# Patient Record
Sex: FEMALE | Race: WHITE | NOT HISPANIC OR LATINO | Employment: FULL TIME | URBAN - METROPOLITAN AREA
[De-identification: names, ages, dates, MRNs, and addresses within clinical notes are randomized per-mention and may not be internally consistent; named-entity substitution may affect disease eponyms.]

---

## 2022-05-17 ENCOUNTER — ANNUAL EXAM (OUTPATIENT)
Dept: OBGYN CLINIC | Facility: CLINIC | Age: 52
End: 2022-05-17
Payer: COMMERCIAL

## 2022-05-17 VITALS
WEIGHT: 124 LBS | DIASTOLIC BLOOD PRESSURE: 72 MMHG | BODY MASS INDEX: 21.17 KG/M2 | HEIGHT: 64 IN | SYSTOLIC BLOOD PRESSURE: 122 MMHG

## 2022-05-17 DIAGNOSIS — Z01.419 ENCOUNTER FOR GYNECOLOGICAL EXAMINATION WITHOUT ABNORMAL FINDING: Primary | ICD-10-CM

## 2022-05-17 DIAGNOSIS — Z12.31 ENCOUNTER FOR SCREENING MAMMOGRAM FOR MALIGNANT NEOPLASM OF BREAST: ICD-10-CM

## 2022-05-17 DIAGNOSIS — N95.1 PERIMENOPAUSAL: ICD-10-CM

## 2022-05-17 PROCEDURE — 99396 PREV VISIT EST AGE 40-64: CPT | Performed by: OBSTETRICS & GYNECOLOGY

## 2022-05-17 RX ORDER — OLOPATADINE HYDROCHLORIDE 1 MG/ML
SOLUTION/ DROPS OPHTHALMIC
COMMUNITY
Start: 2022-05-13

## 2022-05-17 RX ORDER — CETIRIZINE HYDROCHLORIDE 10 MG/1
TABLET ORAL
COMMUNITY

## 2022-05-17 NOTE — PROGRESS NOTES
[address], [address], [facility], [address] [facility]    ASSESSMENT/PLAN: Mayela Perkins is a 46 y o   who presents for annual gynecologic exam     Encounter for routine gynecologic examination  - Routine well woman exam completed today  - Cervical Cancer Screening: Current ASCCP Guidelines reviewed  Last Pap: 2020   Next Pap Due:   - COVID vaccine    + COVID   In   3/2022   [place]   2021   - Contraceptive counseling discussed  Current contraception: vasectomy :   - Breast Cancer Screening: Last Mammogram 2021, order given    - Colorectal cancer screening  Due in  } ordered  - The following were reviewed in today's visit: breast self exam, mammography screening ordered, menopause, osteoporosis, adequate intake of calcium and vitamin D, exercise, healthy diet and colonoscopy discussed and ordered    Additional problems addressed during this visit:  1  Encounter for gynecological examination without abnormal finding    2  Encounter for screening mammogram for malignant neoplasm of breast  -     Mammo screening bilateral w 3d & cad; Future    3  Perimenopausal  Comments:  lmp  2021  + hot flashe , wt gain and   memory  Notify office if desires SSRI or  HRT    4  BMI 21 0-21 9, adult        CC:  Annual Gynecologic Examination    HPI: Mayela Perkins is a 46 y o   who presents for annual gynecologic examination  47 yo here for wellness exam    LMP  Is   2021  The following portions of the patient's history were reviewed and updated as appropriate: She  has no past medical history on file  She  has a past surgical history that includes Hysteroscopy w/ endometrial ablation; Hysteroscopy w/ polypectomy;  section; Tonsillectomy; Cystectomy; Thyroplasty; and Bunionectomy    Her family history includes Breast cancer in her mother; Diabetes in her maternal grandmother; Hypertension in her father, maternal grandfather, maternal grandmother, and mother; Osteoporosis in her maternal grandmother and mother  She  reports that she has never smoked  She has never used smokeless tobacco  She reports current alcohol use  She reports that she does not use drugs  Current Outpatient Medications   Medication Sig Dispense Refill    cetirizine (ZyrTEC) 10 mg tablet Take by mouth      Cholecalciferol 50 MCG (2000 UT) TABS Take 2,000 Units by mouth in the morning   olopatadine (PATANOL) 0 1 % ophthalmic solution PUT 1 DROP INTO EACH EYE TWICE A DAY       No current facility-administered medications for this visit  She is allergic to cephalexin, gadobenate, nitrofurantoin, and sulfamethoxazole-trimethoprim       Review of Systems:  All systems normal except as noted in HPI          Objective:  /72 (BP Location: Left arm, Patient Position: Sitting, Cuff Size: Standard)   Ht 5' 4" (1 626 m)   Wt 56 2 kg (124 lb)   BMI 21 28 kg/m²    Physical Exam  Vitals and nursing note reviewed  Constitutional:       Appearance: Normal appearance  HENT:      Head: Normocephalic  Cardiovascular:      Rate and Rhythm: Normal rate and regular rhythm  Pulmonary:      Effort: Pulmonary effort is normal       Breath sounds: Normal breath sounds  Chest:      Chest wall: No mass, lacerations, swelling, tenderness or edema  Breasts: Jameson Score is 4  Breasts are symmetrical       Right: Normal  No swelling, bleeding, inverted nipple, mass, nipple discharge, skin change, tenderness, axillary adenopathy or supraclavicular adenopathy  Left: No swelling, bleeding, inverted nipple, mass, nipple discharge, skin change, tenderness, axillary adenopathy or supraclavicular adenopathy  Abdominal:      General: Abdomen is flat  Bowel sounds are normal       Palpations: Abdomen is soft  Genitourinary:     General: Normal vulva  Exam position: Lithotomy position  Pubic Area: No rash         Jameson stage (genital): 4       Labia:         Right: No rash, tenderness or lesion  Left: No rash, tenderness or lesion  Urethra: No urethral pain, urethral swelling or urethral lesion  Vagina: Normal       Cervix: No cervical motion tenderness or discharge  Uterus: Normal        Adnexa: Right adnexa normal and left adnexa normal       Rectum: Normal    Musculoskeletal:         General: Normal range of motion  Cervical back: Neck supple  Lymphadenopathy:      Upper Body:      Right upper body: No supraclavicular, axillary or pectoral adenopathy  Left upper body: No supraclavicular, axillary or pectoral adenopathy  Lower Body: No right inguinal adenopathy  No left inguinal adenopathy  Skin:     General: Skin is warm and dry  Neurological:      Mental Status: She is alert     Psychiatric:         Mood and Affect: Mood normal

## 2022-05-17 NOTE — PATIENT INSTRUCTIONS
Pap every 3 years starting at age 24 if normal, STI testing as indicated, exercise most days of week, obtain appropriate diet and hydration, Calcium 1000mg + 600 vit D daily, birth control as directed (ACHES reviewed)  Benefits, risks and alternatives discussed/reviewed  Condom use when sexually active for sexually transmitted infection prevention  HPV 9 vaccine recommended through age 39  Check with your insurance for coverage  If covered, call office to schedule start of vaccine series  Annual mammogram starting at age 36, monthly breast self exam  Ebbie Stank   at red light or at least  20 times a day

## 2022-07-18 ENCOUNTER — HOSPITAL ENCOUNTER (OUTPATIENT)
Dept: MAMMOGRAPHY | Facility: IMAGING CENTER | Age: 52
Discharge: HOME | End: 2022-07-18
Payer: COMMERCIAL

## 2022-07-18 VITALS — BODY MASS INDEX: 21.17 KG/M2 | HEIGHT: 64 IN | WEIGHT: 124 LBS

## 2022-07-18 DIAGNOSIS — Z12.31 ENCOUNTER FOR SCREENING MAMMOGRAM FOR MALIGNANT NEOPLASM OF BREAST: ICD-10-CM

## 2022-07-18 PROCEDURE — 77063 BREAST TOMOSYNTHESIS BI: CPT

## 2022-07-18 PROCEDURE — 77067 SCR MAMMO BI INCL CAD: CPT

## 2022-07-21 ENCOUNTER — TELEPHONE (OUTPATIENT)
Dept: OBGYN CLINIC | Facility: CLINIC | Age: 52
End: 2022-07-21

## 2022-08-02 ENCOUNTER — HOSPITAL ENCOUNTER (OUTPATIENT)
Dept: ULTRASOUND IMAGING | Facility: CLINIC | Age: 52
Discharge: HOME | End: 2022-08-02
Payer: COMMERCIAL

## 2022-08-02 VITALS — WEIGHT: 124 LBS | BODY MASS INDEX: 21.17 KG/M2 | HEIGHT: 64 IN

## 2022-08-02 DIAGNOSIS — R92.8 ABNORMAL SCREENING MAMMOGRAM: ICD-10-CM

## 2022-08-02 PROCEDURE — 76642 ULTRASOUND BREAST LIMITED: CPT

## 2022-08-04 ENCOUNTER — TELEPHONE (OUTPATIENT)
Dept: OBGYN CLINIC | Facility: CLINIC | Age: 52
End: 2022-08-04

## 2022-08-04 NOTE — TELEPHONE ENCOUNTER
Pt called requesting CPT code for screening mammogram and diagnosis code    CPT code: 76299  ICD 10: Z12 31

## 2022-08-17 ENCOUNTER — TELEPHONE (OUTPATIENT)
Dept: OBGYN CLINIC | Facility: CLINIC | Age: 52
End: 2022-08-17

## 2022-08-17 DIAGNOSIS — Z80.3 FAMILY HISTORY OF BREAST CANCER IN MOTHER: Primary | ICD-10-CM

## 2022-08-17 NOTE — TELEPHONE ENCOUNTER
Pt left a message, TAMIKO Lepe has recommended for her to have breast genetic testing and is calling to inquire what codes will be used to verify insurance coverage  Eleni, please review and advise, pt will need referral for ambulatory [facility] oncology genetics, my understanding is they will check with insurance for coverage

## 2022-08-18 NOTE — TELEPHONE ENCOUNTER
Left a message for patient to call back to discuss Eleni NP placed an order for patient to have genetics testing, provide scheduling information and inform they can help with providing insurance verification

## 2022-08-19 ENCOUNTER — TELEPHONE (OUTPATIENT)
Dept: GENETICS | Facility: CLINIC | Age: 52
End: 2022-08-19

## 2022-08-19 NOTE — TELEPHONE ENCOUNTER
Called patient and advised her of the referral and provided her with [facility] genetic [place] line number to call and schedule [phone]  She voiced appreciation

## 2022-08-19 NOTE — TELEPHONE ENCOUNTER
I called [facility] to schedule a new patient appointment with the Cancer Risk and Genetics Program       Outcome:  Genetics appointment scheduled for 10/7/2022 9am with [facility] MARIANO    Personal/Family History Related to Appointment:  Family history of Breast cancer    History of Genetic Testing:  Patient reports no personal or family history of genetic testing

## 2022-09-22 ENCOUNTER — TELEPHONE (OUTPATIENT)
Dept: HEMATOLOGY ONCOLOGY | Facility: CLINIC | Age: 52
End: 2022-09-22

## 2022-09-30 ENCOUNTER — TELEPHONE (OUTPATIENT)
Dept: GENETICS | Facility: CLINIC | Age: 52
End: 2022-09-30

## 2022-09-30 NOTE — TELEPHONE ENCOUNTER
I called and spoke with Mike Henderson ahead of her genetic counseling appointment on 10/07/2022  I explained that we have a family history questionnaire available that Mike Henderson can fill out prior to her visit in order to expedite the family history intake  In order to complete this form, I can send Mike Henderson a link via email that will direct her to the questionaire  Outcome:  Patient is agreeable to completing this questionaire  I confirmed the patient's email address and will send a link

## 2022-10-07 ENCOUNTER — CLINICAL SUPPORT (OUTPATIENT)
Dept: GENETICS | Facility: CLINIC | Age: 52
End: 2022-10-07

## 2022-10-07 ENCOUNTER — DOCUMENTATION (OUTPATIENT)
Dept: GENETICS | Facility: CLINIC | Age: 52
End: 2022-10-07

## 2022-10-07 DIAGNOSIS — Z80.41 FAMILY HISTORY OF OVARIAN CANCER: ICD-10-CM

## 2022-10-07 DIAGNOSIS — D44.7 PARAGANGLIOMA (HCC): ICD-10-CM

## 2022-10-07 DIAGNOSIS — Z80.3 FAMILY HISTORY OF BREAST CANCER: Primary | ICD-10-CM

## 2022-10-07 PROCEDURE — NC001 PR NO CHARGE: Performed by: GENETIC COUNSELOR, MS

## 2022-10-07 NOTE — LETTER
2022     Kenny Vegas, [address] [facility] [address] [place] [address],[address]  [place] [address]    Patient: Espinoza Goodson  YOB: 1970  Date of Visit: 10/7/2022      Dear Dr Daisy Collins: Thank you for referring Espinoza Goodson to me for evaluation  Below are my notes for this consultation  If you have questions, please do not hesitate to call me  I look forward to following your patient along with you  Sincerely,        Nicholas Mix        CC: Caren Bender MD        Pre-Test Genetic Counseling Consult Note    Patient Name: Espinoza Goodson   /Age: 10 y o  Referring Provider: JUSTINO Olivia    Date of Service: 10/7/2022  Genetic Counselor: Nicholas Mxi MS, [address] [facility]  Interpretation Services: None  [facility]      Keeley Blanchard was referred to the [address] [place] [address] and Genetic Assessment Program due to her family history of BREAST AND OVARIAN CANCER  she presents today to discuss the possibility of a hereditary cancer syndrome, options for genetic testing, and implications for her and her family  Cancer History and Treatment:   Personal History:  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     Screening Hx:   Breast:  Breast Imaging: Mammo on  and follow up left u/s  IMPRESSION:   Minimally complicated cyst corresponds to the mammographic mass  Recommend ultrasound re-evaluation in 6 months      Colon:  Colonoscopy: has had a few over lifetime  3-5 polyps reported over lifetime, most recent colonoscopy was clear    Gynecologic:  Ovaries and Uterus intact    History of uterine polyps    Skin:  saw derm last year, no concerns    Other screening: none    Reproductive History  Age at menarche: 4  Age at first live birth: 25  Menopause: Perimenopausal  Hormone replacement: none    Medical and Surgical History  Pertinent surgical history:   Past Surgical History:   Procedure Laterality Date    BUNIONECTOMY      left foot bunionectomy and bunionette      SECTION      CYSTECTOMY      paraganglioma cyst removal    HYSTEROSCOPY W/ ENDOMETRIAL ABLATION      2016    HYSTEROSCOPY W/ POLYPECTOMY      THYROPLASTY      goretex    TONSILLECTOMY        Pertinent medical history:No past medical history on file  Other History:  Height:   Ht Readings from Last 1 Encounters:   22 5' 4" (1 626 m)     Weight:   Wt Readings from Last 1 Encounters:   22 56 2 kg (124 lb)       Relevant Family History   Patient reports no Ashkenazi Uatsdin ancestry  Sister with recent breast follow up due to bloody discharge  Biopsy performed was benign    Maternal Family History: Mother (80) history of right breast cancer (dx 78)  Aunt (68) history of ovarian cancer  No other reported history of cancer    Paternal Family History:   Limited information on paternal family history    Please refer to the scanned pedigree in the Media Tab for a complete family history     *All history is reported as provided by the patient; records are not available for review, except where indicated  Assessment:  We discussed sporadic, familial and hereditary cancer  We also discussed the many factors that influence our risk for cancer such as age, environmental exposures, lifestyle choices and family history  We reviewed the indications suggestive of a hereditary predisposition to cancer      Genetic testing is indicated for Rashad Lizama based on the following criteria: Meets NCCN V1 2023 Testing Criteria for High-Penetrance Breast Cancer Susceptibility Genes: family history of a first-degree relative with a history of breast cancer and one close blood relative diagnosed with ovarian cancer  Meets NCCN R3 6983 Testing Criteria for Genetic Risk Evaluation for Hereditary Endocrine Neoplasia Syndromes: personal history of paraganglioma    The risks, benefits, and limitations of genetic testing were reviewed with the patient, as well as genetic discrimination laws, and possible test results (positive, negative, variants of uncertain significance) and their clinical implications  If positive for a mutation, options for managing cancer risk including increased surveillance, chemoprevention, and in some cases prophylactic surgery were discussed  Sacha Hoffman was informed that if a hereditary cancer syndrome was identified in her, first degree relatives (parents, siblings, and children) have a chance of also inheriting the condition  Genetic testing would allow for predictive genetic testing in other relatives, who may also be at risk depending on their degree of relation  Plan: Patient decided to proceed with testing and provided consent  Summary:     Sample Collection:  A blood kit was mailed home to the patient    Genetic Testing Preformed: Mikael Zavala (39 total genes): APC, CAROLINE, AXIN2 BARD1, BRCA1, BRCA2, BRIP1, BMPR1A, CDH1, CDK4, CDKN2A, CHEK2, DICER1, EPCAM, GREM1, HOXB13, MLH1, MSH2, MSH3, MSH6, MUTYH, NBN, NF1, NTHL1, PALB2, PMS2, POLD1, POLE, PTEN, RAD51C, RAD51D, RECQL SMAD4, SMARCA4, STK11, TP53 + Ambry PGLNext (14 genes): EGLN1, FH, KIF1B, MAX, MEN1, NF1, RET, SDHA, SDHAF2, SDHB, SDHC, SDHD, [id number], VHL     Results take approximately 2-3 weeks to complete once test is started  We will contact Sacha Yasmin once results are available  Additional recommendations for surveillance/medical management will be made pending genetic test results

## 2022-10-07 NOTE — PROGRESS NOTES
Pre-Test Genetic Counseling Consult Note    Patient Name: Shona Burroughs DOB/Age:  y o  Referring Provider: JUSTINO Casper    Date of Service: 10/7/2022  Genetic Counselor: Emily Queen MS, [facility]  Interpretation Services: None  [facility]      Areli Mehta was referred to the [address] [place] [address] and Genetic Assessment Program due to her family history of BREAST AND OVARIAN CANCER  she presents today to discuss the possibility of a hereditary cancer syndrome, options for genetic testing, and implications for her and her family  Cancer History and Treatment:   Personal History:  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     Screening Hx:   Breast:  Breast Imaging: Mammo on  and follow up left u/s  IMPRESSION:   Minimally complicated cyst corresponds to the mammographic mass  Recommend ultrasound re-evaluation in 6 months  Colon:  Colonoscopy: has had a few over lifetime  3-5 polyps reported over lifetime, most recent colonoscopy was clear    Gynecologic:  Ovaries and Uterus intact    History of uterine polyps    Skin:  saw derm last year, no concerns    Other screening: none    Reproductive History  Age at menarche: 4  Age at first live birth: 25  Menopause: Perimenopausal  Hormone replacement: none    Medical and Surgical History  Pertinent surgical history:   Past Surgical History:   Procedure Laterality Date    BUNIONECTOMY      left foot bunionectomy and bunionette      SECTION      CYSTECTOMY      paraganglioma cyst removal    HYSTEROSCOPY W/ ENDOMETRIAL ABLATION      2016    HYSTEROSCOPY W/ POLYPECTOMY      THYROPLASTY      goretex    TONSILLECTOMY        Pertinent medical history:No past medical history on file        Other History:  Height:   Ht Readings from Last 1 Encounters:   22 5' 4" (1 626 m)     Weight:   Wt Readings from Last 1 Encounters:   22 56 2 kg (124 lb) Relevant Family History   Patient reports no Ashkenazi Scientologist ancestry  Sister with recent breast follow up due to bloody discharge  Biopsy performed was benign    Maternal Family History: Mother (80) history of right breast cancer (dx 78)  Aunt (68) history of ovarian cancer  No other reported history of cancer    Paternal Family History:   Limited information on paternal family history    Please refer to the scanned pedigree in the Media Tab for a complete family history     *All history is reported as provided by the patient; records are not available for review, except where indicated  Assessment:  We discussed sporadic, familial and hereditary cancer  We also discussed the many factors that influence our risk for cancer such as age, environmental exposures, lifestyle choices and family history  We reviewed the indications suggestive of a hereditary predisposition to cancer  Genetic testing is indicated for Katheryn Fairchild based on the following criteria: Meets NCCN V1 2023 Testing Criteria for High-Penetrance Breast Cancer Susceptibility Genes: family history of a first-degree relative with a history of breast cancer and one close blood relative diagnosed with ovarian cancer  Meets NCCN A3 0139 Testing Criteria for Genetic Risk Evaluation for Hereditary Endocrine Neoplasia Syndromes: personal history of paraganglioma    The risks, benefits, and limitations of genetic testing were reviewed with the patient, as well as genetic discrimination laws, and possible test results (positive, negative, variants of uncertain significance) and their clinical implications  If positive for a mutation, options for managing cancer risk including increased surveillance, chemoprevention, and in some cases prophylactic surgery were discussed   Katheryn Fairchild was informed that if a hereditary cancer syndrome was identified in her, first degree relatives (parents, siblings, and children) have a chance of also inheriting the condition  Genetic testing would allow for predictive genetic testing in other relatives, who may also be at risk depending on their degree of relation  Plan: Patient decided to proceed with testing and provided consent  Summary:     Sample Collection:  A blood kit was mailed home to the patient    Genetic Testing Preformed: Terry Landeros (39 total genes): APC, CAROLINE, AXIN2 BARD1, BRCA1, BRCA2, BRIP1, BMPR1A, CDH1, CDK4, CDKN2A, CHEK2, DICER1, EPCAM, GREM1, HOXB13, MLH1, MSH2, MSH3, MSH6, MUTYH, NBN, NF1, NTHL1, PALB2, PMS2, POLD1, POLE, PTEN, RAD51C, RAD51D, RECQL SMAD4, SMARCA4, STK11, TP53 + Ambry PGLNext (14 genes): EGLN1, FH, KIF1B, MAX, MEN1, NF1, RET, SDHA, SDHAF2, SDHB, SDHC, SDHD, [id number], VHL     Results take approximately 2-3 weeks to complete once test is started  We will contact Zenia Cruz once results are available  Additional recommendations for surveillance/medical management will be made pending genetic test results

## 2022-10-14 ENCOUNTER — APPOINTMENT (OUTPATIENT)
Dept: LAB | Facility: HOSPITAL | Age: 52
End: 2022-10-14
Payer: COMMERCIAL

## 2022-10-14 DIAGNOSIS — Z80.41 FAMILY HISTORY OF OVARIAN CANCER: ICD-10-CM

## 2022-10-14 DIAGNOSIS — Z80.3 FAMILY HISTORY OF BREAST CANCER IN SISTER: ICD-10-CM

## 2022-10-14 PROCEDURE — 36415 COLL VENOUS BLD VENIPUNCTURE: CPT

## 2022-11-02 ENCOUNTER — TELEPHONE (OUTPATIENT)
Dept: GENETICS | Facility: CLINIC | Age: 52
End: 2022-11-02

## 2022-11-02 DIAGNOSIS — D44.7 PARAGANGLIOMA (HCC): ICD-10-CM

## 2022-11-02 DIAGNOSIS — Z15.89 MONOALLELIC MUTATION OF SDHB GENE: Primary | ICD-10-CM

## 2022-11-02 NOTE — TELEPHONE ENCOUNTER
Post-Test Genetic Counseling Consult Note  Today I spoke with Alessio Arora over the phone to review the results of her genetic test for hereditary cancer  We met previously on 10/7 for pre-test counseling  SUMMARY:    Test(s): [facility] (36 total genes): APC, CAROLINE, AXIN2 BARD1, BRCA1, BRCA2, BRIP1, BMPR1A, CDH1, CDK4, CDKN2A, CHEK2, DICER1, EPCAM, GREM1, HOXB13, MLH1, MSH2, MSH3, MSH6, MUTYH, NBN, NF1, NTHL1, PALB2, PMS2, POLD1, POLE, PTEN, RAD51C, RAD51D, RECQL SMAD4, SMARCA4, STK11, TP53 + [facility] PGLNext (14 genes): EGLN1, FH, KIF1B, MAX, MEN1, NF1, RET, SDHA, SDHAF2, SDHB, SDHC, SDHD, [id number], VHL    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), Heterozygous, Pathogenic      Additional Result: Variant of uncertain significance  RECQL c 245T>C (p I82T); heterozygous; uncertain significance     SDHB Assessment:   Alessio Arora 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, 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)  The SDHB gene is associated with autosomal dominant hereditary paraganglioma-pheochromocytoma (PGL-PCC) syndrome ([employer] UID: [id number]), gastrointestinal stromal tumors (GIST) ([employer] UID: [id number]), renal cancer (PMID: 68754247, 85711737) and autosomal recessive mitochondrial complex II deficiency with or without cardiomyopathy (Toy Mitchell: [id number])  Studies also suggest that the SDHB gene is associated with pituitary adenomas, thyroid cancer and other neuroendocrine tumors (PMID: 70674766)  Hereditary Paraganglioma-Pheochromocytoma (PGL-PCC) syndrome    Paraganglioma (PGL)  Individuals with pathogenic variants in the SDHB gene have an increased risk for paragangliomas (PGL)  PGLs are rare neuroendocrine tumors that usually form along major blood vessels and nerve pathways in your head, neck, thorax and abdomen  They can develop along the sympathetic nervous system and/or the parasympathetic nervous system  The SDHB gene is strongly associated with paragangliomas of the sympathetic nervous system          - Sympathetic paragangliomas most often develop in the lower mediastinum, abdomen and pelvis  They can produce extra hormones called catecholamines  The extra hormones can cause symptoms such as high blood pressure, headaches, heart palpitations, arrhythmias, excess sweating and anxiety  - Parasympathetic paragangliomas most often develop in the head, neck and upper mediastinum  Typically they do not produce extra hormones  Individuals with head and neck PGL may present with large neck masses, hoarse voice, pain, or cough (PMID: 85609791)  Depending on the location of the PGL individuals may also experience tinnitus (ringing in the ear that doesn't stop) or hearing loss (PMID: 88864485)  The majority of paragangliomas are benign (non-cancerous) however they have the potential to become cancerous and metastasize (travel to other parts of the body)  Individuals with an SDHB mutation have a greater risk of developing a malignant paraganglioma and metastatic disease  Pheochromocytoma [facility])   Individuals with pathogenic variants in the SDHB gene have an increased risk for pheochromocytomas [facility])  PCCs are rare tumors that develop in the adrenal gland  We have two adrenal glands (one located at the top of each kidney)  These tumors are usually benign (non-cancerous) but a small percentage can become cancerous and metastasize  These tumors can produce extra hormones called catecholamines that can cause symptoms such as high blood pressure, headaches, heart palpitations, arrhythmias, excess sweating and anxiety  The lifetime risk of developing a paraganglioma and/or pheochromocytoma is 22-49% (PMID: 09534009, 79785845, 50035244)  Gastrointestinal stromal tumors (GIST)  Individuals with pathogenic variants in the SDHB gene may have an increased risk for gastrointestinal stromal tumors (GIST) however the exact lifetime risk is not known    The GISTs are almost always located in the stomach and gastric (stomach) bleeding is often a symptom  Renal clear cell carcinoma (Kidney Cancer)  Individuals with pathogenic variants in the SDHB gene have a 5% lifetime risk of developing kidney cancer compared to 1 7% risk in the general population (PMID: 98662792, 03947294)  Other Tumors  Papillary thyroid cancer, pituitary adenomas and neuroendocrine tumors have also been described in individuals with pathogenic variants in the SDHB gene however the exact lifetime risk is not known  Reproductive Risks:  Reena Galindo 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 and is a 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  Autosomal recessive means an individual needs two pathogenic variants in the SDHB gene to be affected with this condition  This is a rare occurrence  If both Reena Galindo and her partner carry one SDHB pathogenic variant there is a risk of having a child with Mitochondrial complex II deficiency  Mitochondrial complex II deficiency is characterized by progressive encephalopathy, myopathy, hypotonia and leukodystrophy  Poor growth, short stature and joint contractures are common findings while only a subset of affected individuals also develop cardiomyopathy  The severity of features is highly variable, ranging from adult-onset of milder symptoms with normal cognition to severe, life-threatening infantile onset (PMID: 40558912, 95236543)  We discussed the reproductive risk for Mitochondrial complex II deficiency including reproductive risks for children if they were found to also carry this SDHB variant  Risks and Testing for Family Members: This test result may help clarify the risk for other family members  All first-degree relatives (parents, siblings and children) have up to a 50% chance of having the SDHB pathogenic variant  Other relatives such as aunts, uncles and cousins may also be at risk        Since it is not known with one-hundred percent certainty which side of the family this SDHB pathogenic variant came from, we recommend that Chris Kimble share this test results with extended family members from both sides of the family  Individuals with SDHB pathogenic variants should begin surveillance between the ages of 9-7 years old therefore testing minor children may be considered  We encouraged Chris Kimble to discuss this information with relatives  If Chris Kimble family members have any questions or are interested in testing they can reach out to the [employer] number at [phone] for additional information  [facility] offers free family member testing for any of Radha's blood relatives up to 80 days following a positive genetic test result  If any of Radha's family members have any questions regarding genetic testing would like to pursue genetic testing to understand if they carry the same SDHB mutation as Chris Kimble they can reach out to the [facility] number at [phone] for additional information  Assessment for VUS Result:  A variant of uncertain significance (VUS) means that a change was identified in a specific gene but it cannot be determined whether the variant is associated with an increased risk of cancer or is a harmless genetic change  The significance of the RECQL variant is currently not known and therefore this test result cannot be used to help determine Chris Kimble cancer risks  It is possible that the variant was seen in only a handful of individuals, or there may be conflicting or incomplete information in the medical literature about the variant and its association with hereditary cancer  Genetic testing for this RECQL variant is not recommended for relatives who wish to determine their cancer risks for purposes of determining medical management  The presence or absence of this variant in a relative is not clinically meaningful unless the variant is reclassified in the future       The laboratory will continue to accumulate information on this variant and will reclassify it as either a positive or negative genetic test result when they are confident that they have adequate information  As updated information is obtained, we will notify Rebekah Bush with the updated information  It is important to note that the majority of variants of uncertain significance are reclassified as likely benign or benign as additional information about the variant becomes available  Risk Based on Family History:  Radha's risk of breast cancer may still be increased based on her personal risk factors and family history  Given that the SDHB mutation and RECQL VUS do not explain her family history, we are able to run risk models to better estimate Radha's lifetime risk of developing breast cancer  I ran three risk models based on the information Rebekah Bush provided during our pre-test counseling session:    Debraer-Tatumck model: Estimated lifetime risk, to age 80, for breast cancer is 16 11%    Lamont Mckeon model:Estimated lifetime risk, to age 80, for breast cancer is 14 25%     Abner tables: Estimated lifetime risk, to age 78, for breast cancer is 8 8%    Although these risk models do not predict a significantly increased lifetime risk, we cannot eliminate the possibility of an increased risk for breast cancer for Rebekah Bush given her family history  I also reminded Rebekah Bush that there is still a background risk for any woman to develop breast cancer over her lifetime (12-13%), regardless of family history      SDHB Management:  Management recommendations for individuals with a pathogenic or likely pathogenic SDHB mutations have been pulled from the "Endocrine Society Clinical Practice Guidelines" (PMID: 03466641), the "[address] Pediatric Oncology Series for [place] [facility] Hippel[place] and Hereditary Pheochromocytoma/Paraganglioma Syndromes: Clinical Features, Genetics, and Surveillance Recommendations in Childhood" (PMID: 12279044), and the NCCN Kidney Cancer, Hereditary Renal Cell Carcinoma, Kidney Specific Screening Recommendations guidelines  These recommendations are subject to change over time and the newest guidelines should be referenced for the most up to date recommendations  We also recommend that Grace Julio meet with a healthcare provider familiar with Hereditary PGL-PCC syndrome  I discussed with Grace Julio that I will be placing a referral to Dr Rachel Olmstead so that she can meet with a provider familiar with this condition  Additional Information:  A healthy lifestyle will improve overall health and reduce risk for illness  Eating a healthy diet and exercising for 4 hours per week is recommended  Both diet and exercise have been shown to help maintain a healthy weight  Moderate to heavy alcohol use can increase the risk for some cancers  Smoking cigarettes can also increase risk for breast, lung, prostate, pancreatic and other cancers  Plan:    PGL/PCC Screening:  - Blood pressure monitoring at all medical visits starting at age 10-11  - Screening blood test for plasma-free metanephrines or 24-hour urine test for fractionated metanephrines every year starting at age 10-11   - Blood and/or urine screening should be done prior to any surgical procedures  - Cross-sectional imaging of the skull base to pelvis every 2-3 years starting at age 10-11  This can be done by whole-body MRI (if available) or other non-radiation-containing imaging procedures  If whole-body MRI is not available, may consider abdominal MRI, skull base and neck MRI, and chest CT    - Education about the signs and symptoms of PGL and [address] tumors  Symptoms of these tumors may include a lump that gets bigger over time, hearing loss, ringing in the ears, difficulty swallowing, hoarseness of the voice, problems moving the tongue, unexplained sweating, headaches, heart palpitations, paleness, and/or feelings of anxiety or panic      Treatment of PGL and [address] may include surgery  - Due to the risk of bilateral tumors, consideration should be given to cortical-sparing adrenalectomy  See NCCN Neuroendocrine and Adrenal Tumors guidelines for further details about surgical and treatment recommendations  GIST Screening:  - Consider complete blood count with RBC indices annually; consider beginning in childhood  - Some experts suggest evaluation for GIST should you experience unexplained gastrointestinal symptoms (e g  abdominal pain, nausea, vomiting, or difficulty swallowing) or unexplained obstruction or anemia    Renal Cancer Screening:    NCCN V3 2023 Kidney Cancer -> Hereditary Renal Cell Carcinoma -> Kidney Specific Screening Recommendations   - Abdominal MRI (preferred) every 4-6 years starting at age 15, or 10 years before the earliest kidney cancer in the family (whichever comes first)  -If an abdominal MRI is unavailable, consider a CT with and without IV contrast  MRI is preferred for surveillance to limit radiation exposure; however, CT can be used for surgical planning as needed  - If lesions are detected, increase imaging frequency based on growth rate and size  - Women of childbearing age should consider imaging prior to planned pregnancy  - If whole-body MRI is being done for PGL-PCC screening, additional imaging of the kidneys may not be necessary  Treatment of kidney cancer or tumors may include surgery:   - Malignant tumors absent aggressive histology and early stage should undergo surgical resection; partial nephrectomy can be considered  - Consider radical nephrectomy for larger tumors and those with aggressive histology (eg, high grade, sarcomatoid)  - See NCCN Kidney Cancer guidelines for further syndrome specific details about surgical and treatment recommendations  Other Screening:  - There are no additional recommendations based on Radha's result    she should continue cancer screening and medical management as clinically indicated and as determined appropriate by her healthcare providers  Pregnancy considerations:  - Pregnant individuals should be monitored more closely due to the risk of complications if a secreting tumor is present  - Ideally, individuals should be screened before a planned pregnancy  Other Screening: There are no additional recommendations based on Radha's result  she should continue cancer screening and medical management as clinically indicated and as determined appropriate by her healthcare providers  Summary:   Positive Result: Melvina Yoon was strongly encouraged to follow up on with our office on an annual basis to review the most up to date guidelines as recommendations are subject to change over time  VUS Result: Melvina Yoon was strongly encouraged to contact us regarding any changes in her personal or family history of cancer as these changes could alter our recommendation regarding genetic testing and/or cancer screening  Melvina Yoon was also encouraged to follow up with us on an annual basis as variant classifications are subject to change

## 2022-11-09 ENCOUNTER — CONSULT (OUTPATIENT)
Dept: HEMATOLOGY ONCOLOGY | Facility: HOSPITAL | Age: 52
End: 2022-11-09

## 2022-11-09 VITALS
HEART RATE: 67 BPM | OXYGEN SATURATION: 98 % | DIASTOLIC BLOOD PRESSURE: 80 MMHG | BODY MASS INDEX: 21 KG/M2 | HEIGHT: 64 IN | TEMPERATURE: 98.1 F | SYSTOLIC BLOOD PRESSURE: 120 MMHG | WEIGHT: 123 LBS | RESPIRATION RATE: 16 BRPM

## 2022-11-09 DIAGNOSIS — D44.7 PARAGANGLIOMA (HCC): ICD-10-CM

## 2022-11-09 DIAGNOSIS — D50.0 IRON DEFICIENCY ANEMIA DUE TO CHRONIC BLOOD LOSS: Primary | ICD-10-CM

## 2022-11-09 DIAGNOSIS — Z15.89 MONOALLELIC MUTATION OF SDHB GENE: ICD-10-CM

## 2022-11-09 RX ORDER — DIPHENOXYLATE HYDROCHLORIDE AND ATROPINE SULFATE 2.5; .025 MG/1; MG/1
1 TABLET ORAL DAILY
COMMUNITY

## 2022-11-10 ENCOUNTER — TELEPHONE (OUTPATIENT)
Dept: HEMATOLOGY ONCOLOGY | Facility: CLINIC | Age: 52
End: 2022-11-10

## 2022-11-10 ENCOUNTER — TELEPHONE (OUTPATIENT)
Dept: GENETICS | Facility: CLINIC | Age: 52
End: 2022-11-10

## 2022-11-10 NOTE — TELEPHONE ENCOUNTER
Patient called regarding availability for daughters to be tested  I let her know the possible appointment types and window for testing  Patient said she would call back to schedule the appointment(s)

## 2022-11-10 NOTE — TELEPHONE ENCOUNTER
Returned call to patient and left VM  I gave her the number for genetics and also left my direct number if she needs anything further

## 2022-11-10 NOTE — TELEPHONE ENCOUNTER
CALL RETURN FORM   Reason for patient call? Patient calling asking about genetic testing for her daughters    Patient's primary oncologist? Ariane Baez    Name of person the patient was calling for? Malik   Any additional information to add, if applicable? [phone]   Informed patient that the message will be forwarded to the team and someone will get back to them as soon as possible    Did you relay this information to the patient?   yes

## 2022-11-10 NOTE — TELEPHONE ENCOUNTER
Medical Records Request Route to [facility]   Person calling In patient   Provider [facility]   Fax Number / Corey Beck name (Attention:)   [address] [place] call back number [phone]   Patient call back number [phone]

## 2022-11-11 LAB
ALBUMIN SERPL-MCNC: 4.3 G/DL (ref 3.6–5.1)
ALBUMIN/GLOB SERPL: 1.6 (CALC) (ref 1–2.5)
ALP SERPL-CCNC: 71 U/L (ref 37–153)
ALT SERPL-CCNC: 12 U/L (ref 6–29)
AST SERPL-CCNC: 17 U/L (ref 10–35)
BASOPHILS # BLD AUTO: 21 CELLS/UL (ref 0–200)
BASOPHILS NFR BLD AUTO: 0.5 %
BILIRUB SERPL-MCNC: 0.7 MG/DL (ref 0.2–1.2)
BUN SERPL-MCNC: 19 MG/DL (ref 7–25)
BUN/CREAT SERPL: NORMAL (CALC) (ref 6–22)
CALCIUM SERPL-MCNC: 9.6 MG/DL (ref 8.6–10.4)
CHLORIDE SERPL-SCNC: 104 MMOL/L (ref 98–110)
CO2 SERPL-SCNC: 29 MMOL/L (ref 20–32)
CREAT SERPL-MCNC: 0.85 MG/DL (ref 0.5–1.03)
EOSINOPHIL # BLD AUTO: 130 CELLS/UL (ref 15–500)
EOSINOPHIL NFR BLD AUTO: 3.1 %
ERYTHROCYTE [DISTWIDTH] IN BLOOD BY AUTOMATED COUNT: 12.6 % (ref 11–15)
FERRITIN SERPL-MCNC: 28 NG/ML (ref 16–232)
GFR/BSA.PRED SERPLBLD CYS-BASED-ARV: 81 ML/MIN/1.73M2
GLOBULIN SER CALC-MCNC: 2.7 G/DL (CALC) (ref 1.9–3.7)
GLUCOSE SERPL-MCNC: 82 MG/DL (ref 65–99)
HCT VFR BLD AUTO: 41.8 % (ref 35–45)
HGB BLD-MCNC: 13.3 G/DL (ref 11.7–15.5)
IRON SERPL-MCNC: 99 MCG/DL (ref 45–160)
LYMPHOCYTES # BLD AUTO: 1852 CELLS/UL (ref 850–3900)
LYMPHOCYTES NFR BLD AUTO: 44.1 %
MCH RBC QN AUTO: 26.2 PG (ref 27–33)
MCHC RBC AUTO-ENTMCNC: 31.8 G/DL (ref 32–36)
MCV RBC AUTO: 82.4 FL (ref 80–100)
MONOCYTES # BLD AUTO: 357 CELLS/UL (ref 200–950)
MONOCYTES NFR BLD AUTO: 8.5 %
NEUTROPHILS # BLD AUTO: 1840 CELLS/UL (ref 1500–7800)
NEUTROPHILS NFR BLD AUTO: 43.8 %
PLATELET # BLD AUTO: 207 THOUSAND/UL (ref 140–400)
PMV BLD REES-ECKER: 10.4 FL (ref 7.5–12.5)
POTASSIUM SERPL-SCNC: 3.9 MMOL/L (ref 3.5–5.3)
PROT SERPL-MCNC: 7 G/DL (ref 6.1–8.1)
RBC # BLD AUTO: 5.07 MILLION/UL (ref 3.8–5.1)
SODIUM SERPL-SCNC: 140 MMOL/L (ref 135–146)
TRANSFERRIN SERPL-MCNC: 288 MG/DL (ref 188–341)
WBC # BLD AUTO: 4.2 THOUSAND/UL (ref 3.8–10.8)

## 2022-11-15 NOTE — PROGRESS NOTES
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  Genetic testing was sent because she had a paraganglioma resected from her neck at [facility] in   See pedigree in genetic counselor chart for famhx  She denies any abd pain  No weight loss  No HA  No bowel problems  No BRBPR or melena  Her last colonoscopy was in 2018  She had heavy periods in the past but they stopped since she had a D&C  She works as an  to a   She denies tob  No Etoh  ROS: As stated in history of present illness otherwise her 14 point review of systems today was negative  ECOG PS: 0    Test Results:    Imaging: No results found  Labs:   Lab Results   Component Value Date    WBC 4 2 11/10/2022    HGB 13 3 11/10/2022    HCT 41 8 11/10/2022    MCV 82 4 11/10/2022     11/10/2022     Lab Results   Component Value Date    K 3 9 11/10/2022     11/10/2022    CO2 29 11/10/2022    BUN 19 11/10/2022    CREATININE 0 85 11/10/2022    CALCIUM 9 6 11/10/2022    AST 17 11/10/2022    ALT 12 11/10/2022    ALKPHOS 71 11/10/2022    EGFR 81 11/10/2022         No results found for: SPEP, UPEP    No results found for: PSA    No results found for: CEA    No results found for: AFP    Lab Results   Component Value Date    IRON 99 11/10/2022    FERRITIN 28 11/10/2022       No results found for: [id number]            Active Problems:   Patient Active Problem List   Diagnosis   • BMI 21 0-21 9, adult   • Perimenopausal       Past Medical History: No past medical history on file      Surgical History:   Past Surgical History:   Procedure Laterality Date   • BUNIONECTOMY      left foot bunionectomy and bunionette    •  SECTION     • CYSTECTOMY paraganglioma cyst removal   • HYSTEROSCOPY W/ ENDOMETRIAL ABLATION      2016   • HYSTEROSCOPY W/ POLYPECTOMY     • THYROPLASTY      goretex   • TONSILLECTOMY         Family History:    Family History   Problem Relation Age of Onset   • Osteoporosis Mother    • Breast cancer Mother         no genetic screening   • Hypertension Mother    • Hypertension Father    • No Known Problems Sister    • No Known Problems Sister    • No Known Problems Daughter    • No Known Problems Daughter    • No Known Problems Daughter    • Osteoporosis Maternal Grandmother    • Hypertension Maternal Grandmother    • Diabetes Maternal Grandmother    • Hypertension Maternal Grandfather    • No Known Problems Paternal Grandmother    • No Known Problems Paternal Grandfather    • No Known Problems Maternal Aunt    • No Known Problems Maternal Aunt    • No Known Problems Maternal Aunt    • No Known Problems Paternal Aunt    • Ovarian cancer Neg Hx    • Colon cancer Neg Hx        Cancer-related family history includes Breast cancer in her mother  There is no history of Ovarian cancer or Colon cancer  Social History:   Social History     Socioeconomic History   • Marital status: /Civil Union     Spouse name: Not on file   • Number of children: Not on file   • Years of education: Not on file   • Highest education level: Not on file   Occupational History   • Not on file   Tobacco Use   • Smoking status: Never Smoker   • Smokeless tobacco: Never Used   Vaping Use   • Vaping Use: Never used   Substance and Sexual Activity   • Alcohol use:  Yes   • Drug use: Never   • Sexual activity: Yes     Partners: Male     Birth control/protection: Post-menopausal   Other Topics Concern   • Not on file   Social History Narrative   • Not on file     Social Determinants of Health     Financial Resource Strain: Not on file   Food Insecurity: Not on file   Transportation Needs: Not on file   Physical Activity: Not on file   Stress: Not on file   Social Connections: Not on file   Intimate Partner Violence: Not on file   Housing Stability: Not on file       Current Medications:   Current Outpatient Medications   Medication Sig Dispense Refill   • Cholecalciferol 50 MCG (2000 UT) TABS Take 2,000 Units by mouth in the morning  • multivitamin (THERAGRAN) TABS Take 1 tablet by mouth daily     • cetirizine (ZyrTEC) 10 mg tablet Take by mouth     • olopatadine (PATANOL) 0 1 % ophthalmic solution PUT 1 DROP INTO EACH EYE TWICE A DAY       No current facility-administered medications for this visit  Allergies: Allergies   Allergen Reactions   • Cephalexin Hives   • Gadobenate Nausea Only     Pt stated nausea from multihance on previous MRI- ok with magnevist or Dotarem   • Nitrofurantoin Hives   • Sulfamethoxazole-Trimethoprim Hives         Physical Exam:    Body surface area is 1 59 meters squared  Wt Readings from Last 3 Encounters:   11/09/22 55 8 kg (123 lb)   08/02/22 56 2 kg (124 lb)   07/18/22 56 2 kg (124 lb)        Temp Readings from Last 3 Encounters:   11/09/22 98 1 °F (36 7 °C) (Temporal)        BP Readings from Last 3 Encounters:   11/09/22 120/80   05/17/22 122/72         Pulse Readings from Last 3 Encounters:   11/09/22 67     @LASTSAO2(3)@    Physical Exam     Constitutional   General appearance: No acute distress, well appearing and well nourished  Eyes   Conjunctiva and lids: No swelling, erythema or discharge  Pupils and irises: Equal, round and reactive to light  Ears, Nose, Mouth, and Throat   External inspection of ears and nose: Normal     Nasal mucosa, septum, and turbinates: Normal without edema or erythema  Oropharynx: Normal with no erythema, edema, exudate or lesions  Pulmonary   Respiratory effort: No increased work of breathing or signs of respiratory distress  Auscultation of lungs: Clear to auscultation  Cardiovascular   Palpation of heart: Normal PMI, no thrills      Auscultation of heart: Normal rate and rhythm, normal S1 and S2, without murmurs  Examination of extremities for edema and/or varicosities: Normal     Carotid pulses: Normal     Abdomen   Abdomen: Non-tender, no masses  Liver and spleen: No hepatomegaly or splenomegaly  Lymphatic   Palpation of lymph nodes in neck: No lymphadenopathy  Musculoskeletal   Gait and station: Normal     Digits and nails: Normal without clubbing or cyanosis  Inspection/palpation of joints, bones, and muscles: Normal     Skin   Skin and subcutaneous tissue: Normal without rashes or lesions  Neurologic   Cranial nerves: Cranial nerves 2-12 intact  Sensation: No sensory loss  Psychiatric   Orientation to person, place, and time: Normal     Mood and affect: Normal           Assessment/ Plan:   SDHB can cause an increased risk of paraganglioma and pheochromocytoma  She has already had a paraganglioma resected from the neck  In addition we must monitor for GIST, RCC, pituitary adenoma, thyroid cancer and other neuroendocrine tumors  Her BP is normal in the office today  I am also going to check urine metanephrines  We will get baseline imaging with an MRI skull base/neck and an MRI of the abdomen  I am also going to check a CT of the chest   Imaging will be done every other year unless there is a reason to do imaging sooner  I will also get a cbc, cmp and iron studies  I will call her with those results  I am going to see her on a yearly basis  I spent 45 minutes in chart review, face to face counseling, coordination of care, and documentation

## 2022-11-16 ENCOUNTER — HOSPITAL ENCOUNTER (OUTPATIENT)
Dept: CT IMAGING | Facility: HOSPITAL | Age: 52
Discharge: HOME | End: 2022-11-16
Attending: INTERNAL MEDICINE

## 2022-11-16 DIAGNOSIS — Z15.89 MONOALLELIC MUTATION OF SDHB GENE: ICD-10-CM

## 2022-12-03 ENCOUNTER — HOSPITAL ENCOUNTER (OUTPATIENT)
Dept: MRI IMAGING | Facility: HOSPITAL | Age: 52
Discharge: HOME | End: 2022-12-03
Attending: INTERNAL MEDICINE

## 2022-12-03 DIAGNOSIS — Z15.89 MONOALLELIC MUTATION OF SDHB GENE: ICD-10-CM

## 2022-12-06 ENCOUNTER — OFFICE VISIT (OUTPATIENT)
Dept: OBGYN CLINIC | Facility: CLINIC | Age: 52
End: 2022-12-06

## 2022-12-06 VITALS
BODY MASS INDEX: 21 KG/M2 | HEIGHT: 64 IN | SYSTOLIC BLOOD PRESSURE: 110 MMHG | WEIGHT: 123 LBS | DIASTOLIC BLOOD PRESSURE: 70 MMHG

## 2022-12-06 DIAGNOSIS — N60.02 BREAST CYST, LEFT: Primary | ICD-10-CM

## 2022-12-06 NOTE — PROGRESS NOTES
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     Past Surgical History:   Procedure Laterality Date   • BUNIONECTOMY      left foot bunionectomy and bunionette    •  SECTION     • CYSTECTOMY      paraganglioma cyst removal   • HYSTEROSCOPY W/ ENDOMETRIAL ABLATION         • HYSTEROSCOPY W/ POLYPECTOMY     • THYROPLASTY      goretex   • TONSILLECTOMY       OB History        4    Para   4    Term   3       1    AB        Living   3       SAB        IAB        Ectopic        Multiple   1    Live Births                   Social History     Tobacco Use   • Smoking status: Never   • Smokeless tobacco: Never   Vaping Use   • Vaping Use: Never used   Substance Use Topics   • Alcohol use: Yes   • Drug use: Never       Current Outpatient Medications   Medication Instructions   • Cholecalciferol 10 MCG (400 UNIT) CHEW Oral   • multivitamin (THERAGRAN) TABS 1 tablet, Oral, Daily       History of Present Illness:   Pt w  Cyst on mammogram  birads  3  No nipple  Dc, no  Changes in skin  Review of Systems:  Review of Systems   Constitutional: Negative  Cardiovascular: Negative  Gastrointestinal: Negative  Genitourinary: Negative  Neurological: Negative  Psychiatric/Behavioral: Negative  Physical Exam:  /70 (BP Location: Left arm, Patient Position: Sitting, Cuff Size: Standard)   Ht 5' 4" (1 626 m)   Wt 55 8 kg (123 lb)   BMI 21 11 kg/m²   Physical Exam  Constitutional:       Appearance: Normal appearance  She is normal weight  Genitourinary:   Breasts:     Right: No swelling, bleeding, inverted nipple, mass, nipple discharge, skin change or tenderness  Left: No swelling, bleeding, inverted nipple, mass, nipple discharge, skin change or tenderness  Pulmonary:      Effort: Pulmonary effort is normal       Breath sounds: Normal breath sounds  Chest:      Chest wall: No mass, lacerations, deformity, swelling, tenderness or edema  Musculoskeletal:      Cervical back: Normal range of motion and neck supple  Lymphadenopathy:      Upper Body:      Right upper body: No supraclavicular, axillary or pectoral adenopathy  Left upper body: No supraclavicular, axillary or pectoral adenopathy  Neurological:      General: No focal deficit present  Mental Status: She is alert and oriented to person, place, and time  Skin:     General: Skin is warm and dry  Psychiatric:         Mood and Affect: Mood normal          Behavior: Behavior normal    Vitals and nursing note reviewed  Assessment:   1  Breast cyst     + genetic screening neg for breast, ovarian and colon cancer   + for [id number]      Plan: Fu imaging scheduled for  2/2023  Pt would like to consider  bx    + in workup for   Risk of  A  Pheochromocytoma tumor  Pending  MRI x 2  Reviewed breast awareness  Report any  Vaginal bleeding  Fu after   [address] [facility] today neg  Mammogram imaging and  consults reviewed  Reviewed with patient that [facility], but that they will not necessarily be reviewed by me immediately  Explained that I will review results at my earliest opportunity and contact patient appropriately

## 2022-12-07 ENCOUNTER — TELEPHONE (OUTPATIENT)
Dept: HEMATOLOGY ONCOLOGY | Facility: HOSPITAL | Age: 52
End: 2022-12-07

## 2022-12-07 NOTE — TELEPHONE ENCOUNTER
Rec'd call from patient asking about her MRIs  I informed the patient that I called the reading room to have the MRI of the abdomen read and we will call  Her once results are in  Patient verbalized understanding

## 2022-12-07 NOTE — TELEPHONE ENCOUNTER
Rec'd call from [facility] at radiology  Significant findings on patient's MRI

## 2022-12-08 ENCOUNTER — TELEPHONE (OUTPATIENT)
Dept: HEMATOLOGY ONCOLOGY | Facility: CLINIC | Age: 52
End: 2022-12-08

## 2022-12-08 NOTE — TELEPHONE ENCOUNTER
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

## 2023-02-06 ENCOUNTER — HOSPITAL ENCOUNTER (OUTPATIENT)
Dept: ULTRASOUND IMAGING | Facility: CLINIC | Age: 53
Discharge: HOME | End: 2023-02-06

## 2023-02-06 VITALS — HEIGHT: 64 IN | BODY MASS INDEX: 21 KG/M2 | WEIGHT: 123 LBS

## 2023-02-06 DIAGNOSIS — R92.8 ABNORMAL FINDINGS ON DIAGNOSTIC IMAGING OF BREAST: ICD-10-CM

## 2023-02-13 ENCOUNTER — APPOINTMENT (OUTPATIENT)
Dept: LAB | Facility: HOSPITAL | Age: 53
End: 2023-02-13
Attending: INTERNAL MEDICINE

## 2023-02-13 DIAGNOSIS — Z15.89 DEFICIENCY OF DNA REPAIR: ICD-10-CM

## 2023-02-16 ENCOUNTER — TELEPHONE (OUTPATIENT)
Dept: HEMATOLOGY ONCOLOGY | Facility: CLINIC | Age: 53
End: 2023-02-16

## 2023-02-16 NOTE — TELEPHONE ENCOUNTER
Rec'd call from patient stating that she dropped off her urine on 2/13 and she hasn't gotten any results yet  I told her that it is still in process and can take about a week  I told her that we will call her to go over results when we get them  Patient verbalized understanding

## 2023-02-17 LAB
METANEPH 24H UR-MRATE: 97 UG/24 HR (ref 36–209)
METANEPHS 24H UR-MCNC: 97 UG/L
NORMETANEPHRINE 24H UR-MCNC: 235 UG/L
NORMETANEPHRINE 24H UR-MRATE: 235 UG/24 HR (ref 131–612)

## 2023-02-21 ENCOUNTER — TELEPHONE (OUTPATIENT)
Dept: HEMATOLOGY ONCOLOGY | Facility: CLINIC | Age: 53
End: 2023-02-21

## 2023-02-21 DIAGNOSIS — Z15.89 MONOALLELIC MUTATION OF SDHB GENE: Primary | ICD-10-CM

## 2023-02-21 DIAGNOSIS — D44.7 PARAGANGLIOMA (HCC): ICD-10-CM

## 2023-02-21 NOTE — TELEPHONE ENCOUNTER
----- Message from Ally Kapadia DO sent at 2/20/2023  6:07 PM EST -----  Regarding: RE: urine test  The urine metanephrines are normal   Her iron levels are on the low side of normal   Would she be willing to see GI for an endoscopy? Her genetic condition can predispose to GIST tumors of the stomach   ----- Message -----  From: Wing Song RN  Sent: 2/20/2023   4:06 PM EST  To: Ally Kapadia DO  Subject: urine test                                       Patient's metanephrines fractionated, urine test has resulted  Can you please review and let me know what to tell the patient? Thanks!   Tiana

## 2023-03-01 ENCOUNTER — TELEPHONE (OUTPATIENT)
Dept: GASTROENTEROLOGY | Facility: CLINIC | Age: 53
End: 2023-03-01

## 2023-03-01 ENCOUNTER — OFFICE VISIT (OUTPATIENT)
Dept: GASTROENTEROLOGY | Facility: CLINIC | Age: 53
End: 2023-03-01

## 2023-03-01 VITALS
TEMPERATURE: 97.8 F | BODY MASS INDEX: 21 KG/M2 | WEIGHT: 123 LBS | DIASTOLIC BLOOD PRESSURE: 68 MMHG | HEIGHT: 64 IN | SYSTOLIC BLOOD PRESSURE: 112 MMHG

## 2023-03-01 DIAGNOSIS — D44.7 PARAGANGLIOMA (HCC): ICD-10-CM

## 2023-03-01 DIAGNOSIS — R79.0 LOW FERRITIN: ICD-10-CM

## 2023-03-01 DIAGNOSIS — Z15.89 MONOALLELIC MUTATION OF SDHB GENE: Primary | ICD-10-CM

## 2023-03-01 DIAGNOSIS — Z86.010 PERSONAL HISTORY OF COLONIC POLYPS: ICD-10-CM

## 2023-03-01 NOTE — PATIENT INSTRUCTIONS
Scheduled date of colonoscopy/EGD (as of today):03/23/2023  Physician performing colonoscopy/EGD:Dr [address]  Location of colonoscopy/EGD:[facility]  Bowel prep reviewed with patient:Gisell  Instructions reviewed with patient by:Malena  Clearances:  n/a

## 2023-03-01 NOTE — PROGRESS NOTES
[facility] - Outpatient Consultation  Chucho Zambrano 45 y o  female MRN: [id number]  Encounter: [id number]          ASSESSMENT AND PLAN:      1  Monoallelic mutation of SDHB gene    - Ambulatory Referral to Gastroenterology  - EGD; Future    2  Paraganglioma ([address] )    - Ambulatory Referral to Gastroenterology    3  Low ferritin    - EGD; Future  - Colonoscopy; Future  - polyethylene glycol (GOLYTELY) 4000 mL solution; Take as directed by the office for colonoscopy  Dispense: 4000 mL; Refill: 0    4  Personal history of colonic polyps    - Colonoscopy; Future  - polyethylene glycol (GOLYTELY) 4000 mL solution; Take as directed by the office for colonoscopy  Dispense: 4000 mL; Refill: [de-identified]     49-year-old pleasant female referred by oncology for mutation of SDHB gene and history of paraganglioma of the neck s/p resection in 2011  I explained that mutations in the SDHB gene can result in gastrointestinal stromal tumors (GIST) commonly of the stomach therefore she was recommended endoscopic evaluation  We will schedule both EGD and colonoscopy given her low ferritin and personal history of colon polyps  She expressed understanding and agreement with this plan  Gave instructions for Golytely prep  She will continue eating iron rich diet  Follow-up as needed  ______________________________________________________________________    HPI: 49-year-old female referred by oncology for mutation of SDHB gene  She denies upper GI symptoms  She had reflux as a child but as an adult, she has made significant dietary changes to prevent reflux  She does not take acid reflux medication  She denies difficulty swallowing  No nausea, vomiting, or stomach pain  She denies diarrhea or constipation  She denies any bloody or black stools  She has history of paraganglioma of the neck s/p resection in 2011  She has history of iron deficiency anemia due to menorrhagia   She underwent endometrial ablation about 6 years ago but continued to have a period up until 2022  She is now in menopause  Her hemoglobin is normal but her ferritin is low  She has been eating an iron rich diet  She believes she had an EGD prior to her paragangloima resection but this was over a decade ago  She had a colonoscopy in 2018 for personal history of colon polyps  We do not have this report, but she was recommended to come back in 5 years  REVIEW OF SYSTEMS:    CONSTITUTIONAL: Denies any fever, chills, rigors, and weight loss  HEENT: No earache or tinnitus  Denies hearing loss or visual disturbances  CARDIOVASCULAR: No chest pain or palpitations  RESPIRATORY: Denies any cough, hemoptysis, shortness of breath or dyspnea on exertion  GASTROINTESTINAL: As noted in the History of Present Illness  GENITOURINARY: No problems with urination  Denies any hematuria or dysuria  NEUROLOGIC: No dizziness or vertigo, denies headaches  MUSCULOSKELETAL: Denies any muscle or joint pain  SKIN: Denies skin rashes or itching  ENDOCRINE: Denies excessive thirst  Denies intolerance to heat or cold  PSYCHOSOCIAL: Denies depression or anxiety  Denies any recent memory loss         Historical Information   Past Medical History:   Diagnosis Date   • Breast cyst, left     fu 2023   for immaging  tc bx   • 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     Past Surgical History:   Procedure Laterality Date   • BUNIONECTOMY      left foot bunionectomy and bunionette    •  SECTION     • CYSTECTOMY      paraganglioma cyst removal   • HYSTEROSCOPY W/ ENDOMETRIAL ABLATION         • HYSTEROSCOPY W/ POLYPECTOMY     • THYROPLASTY      goretex   • TONSILLECTOMY       Social History   Social History     Substance and Sexual Activity   Alcohol Use Yes     Social History     Substance and Sexual Activity   Drug Use Never     Social History     Tobacco Use   Smoking Status Never   Smokeless Tobacco Never     Family History   Problem Relation Age of Onset   • Osteoporosis Mother    • Breast cancer Mother         no genetic screening   • Hypertension Mother    • Hypertension Father    • No Known Problems Sister    • No Known Problems Sister    • No Known Problems Daughter    • No Known Problems Daughter    • No Known Problems Daughter    • Osteoporosis Maternal Grandmother    • Hypertension Maternal Grandmother    • Diabetes Maternal Grandmother    • Hypertension Maternal Grandfather    • No Known Problems Paternal Grandmother    • No Known Problems Paternal Grandfather    • No Known Problems Maternal Aunt    • No Known Problems Maternal Aunt    • No Known Problems Maternal Aunt    • No Known Problems Paternal Aunt    • Ovarian cancer Neg Hx    • Colon cancer Neg Hx        Meds/Allergies       Current Outpatient Medications:   •  Cholecalciferol 10 MCG (400 UNIT) CHEW  •  multivitamin (THERAGRAN) TABS    Allergies   Allergen Reactions   • Cephalexin Hives   • Gadobenate Nausea Only     Pt stated nausea from multihance on previous MRI- ok with magnevist or Dotarem   • Nitrofurantoin Hives   • Sulfamethoxazole-Trimethoprim Hives           Objective     There were no vitals taken for this visit  There is no height or weight on file to calculate BMI  PHYSICAL EXAM:      General Appearance:   Alert, cooperative, no distress   HEENT:   Normocephalic, atraumatic, anicteric      Neck:  Supple, symmetrical, trachea midline   Lungs:   Clear to auscultation bilaterally; no rales, rhonchi or wheezing; respirations unlabored    Heart[de-identified]   Regular rate and rhythm; no murmur, rub, or gallop     Abdomen:   Soft, non-tender, non-distended; normal bowel sounds; no masses, no organomegaly    Genitalia:   Deferred    Rectal:   Deferred    Extremities:  No cyanosis, clubbing or edema    Pulses:  2+ and symmetric    Skin:  No jaundice, rashes, or lesions    Lymph nodes:  No palpable cervical lymphadenopathy        Lab Results: No visits with results within 1 Day(s) from this visit  Latest known visit with results is:   Appointment on 02/13/2023   Component Date Value   • Metaneph, Total, 24H Ur 02/13/2023 97    • Metanephrines, Ur 02/13/2023 97    • Normetanephrine, Ur 02/13/2023 235    • Normetanephrine, 24H Ur 02/13/2023 235          Radiology Results:   US breast left limited (diagnostic)    Result Date: 2/7/2023  Narrative: DIAGNOSIS: Abnormal findings on diagnostic imaging of breast TECHNIQUE: Ultrasound of the left breast(s) was performed  COMPARISONS: Prior breast imaging dated: 08/02/2022, 07/18/2022, 07/17/2021, and 07/13/2020 RELEVANT HISTORY: Family Breast Cancer History: History of breast cancer in Mother  Family Medical History: Family medical history includes breast cancer in mother  Personal History: Hormone history includes birth control  No known relevant surgical history  No known relevant medical history  RISK ASSESSMENT: 5 Year Tyrer-Cuzick: 3 74 % 10 Year Tyrer-Cuzick: 8 08 % Lifetime Tyrer-Cuzick: 26 26 % INDICATION: Donna Bledsoe is a 45 y o  female presenting for follow-up left breast cyst  FINDINGS: LEFT A) CYST: Left breast 9:00 4 cm from nipple cyst measuring 5 x 2 x 6 mm is characteristically benign and does not require further follow-up  Impression:  Benign findings  Patient should return to annual screening mammography which will be due after 7/18/2023  This patient has been identified as being at elevated risk for development of breast cancer based on the Tyrer-Cuzick model  She may benefit from supplemental screening  ASSESSMENT/BI-RADS CATEGORY: Left: 2 - Benign Overall: 2 - Benign RECOMMENDATION:      - Return to routine screening for both breasts   Workstation ID: [id number]

## 2023-03-01 NOTE — H&P (VIEW-ONLY)
[facility] - Outpatient Consultation  Jazmin Espino 45 y o  female MRN: [id number]  Encounter: [id number]          ASSESSMENT AND PLAN:      1  Monoallelic mutation of SDHB gene    - Ambulatory Referral to Gastroenterology  - EGD; Future    2  Paraganglioma ([address] )    - Ambulatory Referral to Gastroenterology    3  Low ferritin    - EGD; Future  - Colonoscopy; Future  - polyethylene glycol (GOLYTELY) 4000 mL solution; Take as directed by the office for colonoscopy  Dispense: 4000 mL; Refill: 0    4  Personal history of colonic polyps    - Colonoscopy; Future  - polyethylene glycol (GOLYTELY) 4000 mL solution; Take as directed by the office for colonoscopy  Dispense: 4000 mL; Refill: [de-identified]     76-year-old pleasant female referred by oncology for mutation of SDHB gene and history of paraganglioma of the neck s/p resection in 2011  I explained that mutations in the SDHB gene can result in gastrointestinal stromal tumors (GIST) commonly of the stomach therefore she was recommended endoscopic evaluation  We will schedule both EGD and colonoscopy given her low ferritin and personal history of colon polyps  She expressed understanding and agreement with this plan  Gave instructions for Golytely prep  She will continue eating iron rich diet  Follow-up as needed  ______________________________________________________________________    HPI: 76-year-old female referred by oncology for mutation of SDHB gene  She denies upper GI symptoms  She had reflux as a child but as an adult, she has made significant dietary changes to prevent reflux  She does not take acid reflux medication  She denies difficulty swallowing  No nausea, vomiting, or stomach pain  She denies diarrhea or constipation  She denies any bloody or black stools  She has history of paraganglioma of the neck s/p resection in 2011  She has history of iron deficiency anemia due to menorrhagia   She underwent endometrial ablation about 6 years ago but continued to have a period up until 2022  She is now in menopause  Her hemoglobin is normal but her ferritin is low  She has been eating an iron rich diet  She believes she had an EGD prior to her paragangloima resection but this was over a decade ago  She had a colonoscopy in 2018 for personal history of colon polyps  We do not have this report, but she was recommended to come back in 5 years  REVIEW OF SYSTEMS:    CONSTITUTIONAL: Denies any fever, chills, rigors, and weight loss  HEENT: No earache or tinnitus  Denies hearing loss or visual disturbances  CARDIOVASCULAR: No chest pain or palpitations  RESPIRATORY: Denies any cough, hemoptysis, shortness of breath or dyspnea on exertion  GASTROINTESTINAL: As noted in the History of Present Illness  GENITOURINARY: No problems with urination  Denies any hematuria or dysuria  NEUROLOGIC: No dizziness or vertigo, denies headaches  MUSCULOSKELETAL: Denies any muscle or joint pain  SKIN: Denies skin rashes or itching  ENDOCRINE: Denies excessive thirst  Denies intolerance to heat or cold  PSYCHOSOCIAL: Denies depression or anxiety  Denies any recent memory loss         Historical Information   Past Medical History:   Diagnosis Date   • Breast cyst, left     fu 2023   for immaging  tc bx   • 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     Past Surgical History:   Procedure Laterality Date   • BUNIONECTOMY      left foot bunionectomy and bunionette    •  SECTION     • CYSTECTOMY      paraganglioma cyst removal   • HYSTEROSCOPY W/ ENDOMETRIAL ABLATION         • HYSTEROSCOPY W/ POLYPECTOMY     • THYROPLASTY      goretex   • TONSILLECTOMY       Social History   Social History     Substance and Sexual Activity   Alcohol Use Yes     Social History     Substance and Sexual Activity   Drug Use Never     Social History     Tobacco Use   Smoking Status Never   Smokeless Tobacco Never     Family History   Problem Relation Age of Onset   • Osteoporosis Mother    • Breast cancer Mother         no genetic screening   • Hypertension Mother    • Hypertension Father    • No Known Problems Sister    • No Known Problems Sister    • No Known Problems Daughter    • No Known Problems Daughter    • No Known Problems Daughter    • Osteoporosis Maternal Grandmother    • Hypertension Maternal Grandmother    • Diabetes Maternal Grandmother    • Hypertension Maternal Grandfather    • No Known Problems Paternal Grandmother    • No Known Problems Paternal Grandfather    • No Known Problems Maternal Aunt    • No Known Problems Maternal Aunt    • No Known Problems Maternal Aunt    • No Known Problems Paternal Aunt    • Ovarian cancer Neg Hx    • Colon cancer Neg Hx        Meds/Allergies       Current Outpatient Medications:   •  Cholecalciferol 10 MCG (400 UNIT) CHEW  •  multivitamin (THERAGRAN) TABS    Allergies   Allergen Reactions   • Cephalexin Hives   • Gadobenate Nausea Only     Pt stated nausea from multihance on previous MRI- ok with magnevist or Dotarem   • Nitrofurantoin Hives   • Sulfamethoxazole-Trimethoprim Hives           Objective     There were no vitals taken for this visit  There is no height or weight on file to calculate BMI  PHYSICAL EXAM:      General Appearance:   Alert, cooperative, no distress   HEENT:   Normocephalic, atraumatic, anicteric      Neck:  Supple, symmetrical, trachea midline   Lungs:   Clear to auscultation bilaterally; no rales, rhonchi or wheezing; respirations unlabored    Heart[de-identified]   Regular rate and rhythm; no murmur, rub, or gallop     Abdomen:   Soft, non-tender, non-distended; normal bowel sounds; no masses, no organomegaly    Genitalia:   Deferred    Rectal:   Deferred    Extremities:  No cyanosis, clubbing or edema    Pulses:  2+ and symmetric    Skin:  No jaundice, rashes, or lesions    Lymph nodes:  No palpable cervical lymphadenopathy        Lab Results: No visits with results within 1 Day(s) from this visit  Latest known visit with results is:   Appointment on 02/13/2023   Component Date Value   • Metaneph, Total, 24H Ur 02/13/2023 97    • Metanephrines, Ur 02/13/2023 97    • Normetanephrine, Ur 02/13/2023 235    • Normetanephrine, 24H Ur 02/13/2023 235          Radiology Results:   US breast left limited (diagnostic)    Result Date: 2/7/2023  Narrative: DIAGNOSIS: Abnormal findings on diagnostic imaging of breast TECHNIQUE: Ultrasound of the left breast(s) was performed  COMPARISONS: Prior breast imaging dated: 08/02/2022, 07/18/2022, 07/17/2021, and 07/13/2020 RELEVANT HISTORY: Family Breast Cancer History: History of breast cancer in Mother  Family Medical History: Family medical history includes breast cancer in mother  Personal History: Hormone history includes birth control  No known relevant surgical history  No known relevant medical history  RISK ASSESSMENT: 5 Year Tyrer-Cuzick: 3 74 % 10 Year Tyrer-Cuzick: 8 08 % Lifetime Tyrer-Cuzick: 26 26 % INDICATION: Mariana Bardales is a 45 y o  female presenting for follow-up left breast cyst  FINDINGS: LEFT A) CYST: Left breast 9:00 4 cm from nipple cyst measuring 5 x 2 x 6 mm is characteristically benign and does not require further follow-up  Impression:  Benign findings  Patient should return to annual screening mammography which will be due after 7/18/2023  This patient has been identified as being at elevated risk for development of breast cancer based on the Tyrer-Cuzick model  She may benefit from supplemental screening  ASSESSMENT/BI-RADS CATEGORY: Left: 2 - Benign Overall: 2 - Benign RECOMMENDATION:      - Return to routine screening for both breasts   Workstation ID: [id number]

## 2023-03-09 ENCOUNTER — TELEPHONE (OUTPATIENT)
Dept: GASTROENTEROLOGY | Facility: CLINIC | Age: 53
End: 2023-03-09

## 2023-03-09 NOTE — TELEPHONE ENCOUNTER
Procedure confirmed  Colonoscopy/Endoscopy     Via: [employer]    Instructions given: Given to Patient at Visit     Prep Given: Golytely    Call the office if there are any questions

## 2023-03-23 ENCOUNTER — ANESTHESIA (OUTPATIENT)
Dept: GASTROENTEROLOGY | Facility: AMBULATORY SURGERY CENTER | Age: 53
End: 2023-03-23

## 2023-03-23 ENCOUNTER — HOSPITAL ENCOUNTER (OUTPATIENT)
Dept: GASTROENTEROLOGY | Facility: AMBULATORY SURGERY CENTER | Age: 53
Discharge: HOME | End: 2023-03-23

## 2023-03-23 ENCOUNTER — ANESTHESIA EVENT (OUTPATIENT)
Dept: GASTROENTEROLOGY | Facility: AMBULATORY SURGERY CENTER | Age: 53
End: 2023-03-23

## 2023-03-23 VITALS
SYSTOLIC BLOOD PRESSURE: 122 MMHG | BODY MASS INDEX: 20.66 KG/M2 | HEIGHT: 64 IN | WEIGHT: 121 LBS | OXYGEN SATURATION: 100 % | HEART RATE: 61 BPM | DIASTOLIC BLOOD PRESSURE: 74 MMHG | RESPIRATION RATE: 18 BRPM | TEMPERATURE: 98.7 F

## 2023-03-23 DIAGNOSIS — Z15.89 MONOALLELIC MUTATION OF SDHB GENE: ICD-10-CM

## 2023-03-23 DIAGNOSIS — R79.0 LOW FERRITIN: ICD-10-CM

## 2023-03-23 DIAGNOSIS — Z86.010 PERSONAL HISTORY OF COLONIC POLYPS: ICD-10-CM

## 2023-03-23 RX ORDER — SODIUM CHLORIDE, SODIUM LACTATE, POTASSIUM CHLORIDE, CALCIUM CHLORIDE 600; 310; 30; 20 MG/100ML; MG/100ML; MG/100ML; MG/100ML
50 INJECTION, SOLUTION INTRAVENOUS CONTINUOUS
Status: DISCONTINUED | OUTPATIENT
Start: 2023-03-23 | End: 2023-03-27 | Stop reason: HOSPADM

## 2023-03-23 RX ORDER — PROPOFOL 10 MG/ML
INJECTION, EMULSION INTRAVENOUS AS NEEDED
Status: DISCONTINUED | OUTPATIENT
Start: 2023-03-23 | End: 2023-03-23

## 2023-03-23 RX ADMIN — PROPOFOL 50 MG: 10 INJECTION, EMULSION INTRAVENOUS at 09:27

## 2023-03-23 RX ADMIN — PROPOFOL 100 MG: 10 INJECTION, EMULSION INTRAVENOUS at 09:40

## 2023-03-23 RX ADMIN — SODIUM CHLORIDE, SODIUM LACTATE, POTASSIUM CHLORIDE, CALCIUM CHLORIDE: 600; 310; 30; 20 INJECTION, SOLUTION INTRAVENOUS at 09:44

## 2023-03-23 RX ADMIN — PROPOFOL 100 MG: 10 INJECTION, EMULSION INTRAVENOUS at 09:19

## 2023-03-23 RX ADMIN — SODIUM CHLORIDE, SODIUM LACTATE, POTASSIUM CHLORIDE, CALCIUM CHLORIDE 50 ML/HR: 600; 310; 30; 20 INJECTION, SOLUTION INTRAVENOUS at 09:01

## 2023-03-23 RX ADMIN — PROPOFOL 100 MG: 10 INJECTION, EMULSION INTRAVENOUS at 09:23

## 2023-03-23 RX ADMIN — PROPOFOL 50 MG: 10 INJECTION, EMULSION INTRAVENOUS at 09:31

## 2023-03-23 NOTE — ANESTHESIA POSTPROCEDURE EVALUATION
Post-Op Assessment Note    CV Status:  Stable  Pain Score: 0    Pain management: adequate     Mental Status:  Alert and awake   Hydration Status:  Euvolemic   PONV Controlled:  Controlled   Airway Patency:  Patent      Post Op Vitals Reviewed: Yes            No notable events documented      BP   117/69   Temp   98   Pulse  69   Resp 16   SpO2   96

## 2023-03-23 NOTE — ANESTHESIA PREPROCEDURE EVALUATION
Procedure:  EGD  COLONOSCOPY    Relevant Problems   ANESTHESIA (within normal limits)        Physical Exam    Airway    Mallampati score: I  TM Distance: >3 FB  Neck ROM: full     Dental   No notable dental hx     Cardiovascular  Cardiovascular exam normal    Pulmonary  Pulmonary exam normal     Other Findings        Anesthesia Plan  ASA Score- 1     Anesthesia Type- IV sedation with anesthesia with ASA Monitors  Additional Monitors:   Airway Plan:     Comment: I discussed risks (reviewed with patient on the anesthesia consent form), benefits and alternatives of monitored sedation including the possibility under sedation to have recall or mild discomfort          Plan Factors-    Chart reviewed  Patient summary reviewed  Induction- intravenous  Postoperative Plan-     Informed Consent- Anesthetic plan and risks discussed with patient  I personally reviewed this patient with the CRNA  Discussed and agreed on the Anesthesia Plan with the CRNA  Asher Pickard

## 2023-03-23 NOTE — INTERVAL H&P NOTE
H&P reviewed  After examining the patient I find no changes in the patients condition since the H&P had been written      Vitals:    03/23/23 0850   BP: 129/77   Pulse: 81   Resp: 21   Temp:    SpO2: 100%

## 2023-06-02 ENCOUNTER — ANNUAL EXAM (OUTPATIENT)
Dept: OBGYN CLINIC | Facility: CLINIC | Age: 53
End: 2023-06-02

## 2023-06-02 VITALS
BODY MASS INDEX: 21.51 KG/M2 | SYSTOLIC BLOOD PRESSURE: 100 MMHG | WEIGHT: 126 LBS | HEIGHT: 64 IN | DIASTOLIC BLOOD PRESSURE: 60 MMHG

## 2023-06-02 DIAGNOSIS — Z80.3 FAMILY HISTORY OF BREAST CANCER IN MOTHER: ICD-10-CM

## 2023-06-02 DIAGNOSIS — Z78.0 POST-MENOPAUSAL: ICD-10-CM

## 2023-06-02 DIAGNOSIS — Z12.31 ENCOUNTER FOR SCREENING MAMMOGRAM FOR MALIGNANT NEOPLASM OF BREAST: ICD-10-CM

## 2023-06-02 DIAGNOSIS — Z01.419 ENCOUNTER FOR GYNECOLOGICAL EXAMINATION WITHOUT ABNORMAL FINDING: Primary | ICD-10-CM

## 2023-06-02 DIAGNOSIS — Z12.4 CERVICAL CANCER SCREENING: ICD-10-CM

## 2023-06-02 PROBLEM — Z30.09 CONTRACEPTIVE EDUCATION: Status: ACTIVE | Noted: 2023-06-02

## 2023-06-09 LAB
CLINICAL INFO: NORMAL
CYTO CVX: NORMAL
DATE PREVIOUS BX: NORMAL
HPV E6+E7 MRNA CVX QL NAA+PROBE: NOT DETECTED
LMP START DATE: NORMAL
SL AMB PREV. PAP:: NORMAL
SPECIMEN SOURCE CVX/VAG CYTO: NORMAL
STAT OF ADQ CVX/VAG CYTO-IMP: NORMAL

## 2023-08-11 ENCOUNTER — HOSPITAL ENCOUNTER (OUTPATIENT)
Dept: MAMMOGRAPHY | Facility: IMAGING CENTER | Age: 53
Discharge: HOME | End: 2023-08-11
Payer: COMMERCIAL

## 2023-08-11 VITALS — WEIGHT: 124 LBS | HEIGHT: 64 IN | BODY MASS INDEX: 21.17 KG/M2

## 2023-08-11 DIAGNOSIS — Z12.31 ENCOUNTER FOR SCREENING MAMMOGRAM FOR MALIGNANT NEOPLASM OF BREAST: ICD-10-CM

## 2023-08-11 PROCEDURE — 77063 BREAST TOMOSYNTHESIS BI: CPT

## 2023-08-11 PROCEDURE — 77067 SCR MAMMO BI INCL CAD: CPT

## 2023-11-13 ENCOUNTER — TELEPHONE (OUTPATIENT)
Dept: HEMATOLOGY ONCOLOGY | Facility: CLINIC | Age: 53
End: 2023-11-13

## 2023-11-13 DIAGNOSIS — D50.0 IRON DEFICIENCY ANEMIA DUE TO CHRONIC BLOOD LOSS: Primary | ICD-10-CM

## 2023-11-15 ENCOUNTER — OFFICE VISIT (OUTPATIENT)
Age: 53
End: 2023-11-15
Payer: COMMERCIAL

## 2023-11-15 VITALS
HEIGHT: 64 IN | WEIGHT: 129 LBS | HEART RATE: 71 BPM | BODY MASS INDEX: 22.02 KG/M2 | TEMPERATURE: 98 F | RESPIRATION RATE: 18 BRPM | SYSTOLIC BLOOD PRESSURE: 134 MMHG | OXYGEN SATURATION: 98 % | DIASTOLIC BLOOD PRESSURE: 79 MMHG

## 2023-11-15 DIAGNOSIS — Z15.89 MONOALLELIC MUTATION OF SDHB GENE: Primary | ICD-10-CM

## 2023-11-15 DIAGNOSIS — D50.0 IRON DEFICIENCY ANEMIA DUE TO CHRONIC BLOOD LOSS: ICD-10-CM

## 2023-11-15 LAB
ALBUMIN SERPL-MCNC: 4.2 G/DL (ref 3.6–5.1)
ALBUMIN/GLOB SERPL: 1.6 (CALC) (ref 1–2.5)
ALP SERPL-CCNC: 73 U/L (ref 37–153)
ALT SERPL-CCNC: 13 U/L (ref 6–29)
AST SERPL-CCNC: 17 U/L (ref 10–35)
BASOPHILS # BLD AUTO: 21 CELLS/UL (ref 0–200)
BASOPHILS NFR BLD AUTO: 0.5 %
BILIRUB SERPL-MCNC: 0.7 MG/DL (ref 0.2–1.2)
BUN SERPL-MCNC: 18 MG/DL (ref 7–25)
BUN/CREAT SERPL: NORMAL (CALC) (ref 6–22)
CALCIUM SERPL-MCNC: 9.6 MG/DL (ref 8.6–10.4)
CHLORIDE SERPL-SCNC: 105 MMOL/L (ref 98–110)
CO2 SERPL-SCNC: 30 MMOL/L (ref 20–32)
CREAT SERPL-MCNC: 0.88 MG/DL (ref 0.5–1.03)
EOSINOPHIL # BLD AUTO: 139 CELLS/UL (ref 15–500)
EOSINOPHIL NFR BLD AUTO: 3.3 %
ERYTHROCYTE [DISTWIDTH] IN BLOOD BY AUTOMATED COUNT: 12.8 % (ref 11–15)
FERRITIN SERPL-MCNC: 22 NG/ML (ref 16–232)
GFR/BSA.PRED SERPLBLD CYS-BASED-ARV: 78 ML/MIN/1.73M2
GLOBULIN SER CALC-MCNC: 2.6 G/DL (CALC) (ref 1.9–3.7)
GLUCOSE SERPL-MCNC: 84 MG/DL (ref 65–139)
HCT VFR BLD AUTO: 41.4 % (ref 35–45)
HGB BLD-MCNC: 13.5 G/DL (ref 11.7–15.5)
IRON SERPL-MCNC: 95 MCG/DL (ref 45–160)
LYMPHOCYTES # BLD AUTO: 1840 CELLS/UL (ref 850–3900)
LYMPHOCYTES NFR BLD AUTO: 43.8 %
MCH RBC QN AUTO: 26.8 PG (ref 27–33)
MCHC RBC AUTO-ENTMCNC: 32.6 G/DL (ref 32–36)
MCV RBC AUTO: 82.1 FL (ref 80–100)
MONOCYTES # BLD AUTO: 370 CELLS/UL (ref 200–950)
MONOCYTES NFR BLD AUTO: 8.8 %
NEUTROPHILS # BLD AUTO: 1831 CELLS/UL (ref 1500–7800)
NEUTROPHILS NFR BLD AUTO: 43.6 %
PLATELET # BLD AUTO: 209 THOUSAND/UL (ref 140–400)
PMV BLD REES-ECKER: 10.8 FL (ref 7.5–12.5)
POTASSIUM SERPL-SCNC: 4.1 MMOL/L (ref 3.5–5.3)
PROT SERPL-MCNC: 6.8 G/DL (ref 6.1–8.1)
RBC # BLD AUTO: 5.04 MILLION/UL (ref 3.8–5.1)
SODIUM SERPL-SCNC: 142 MMOL/L (ref 135–146)
TRANSFERRIN SERPL-MCNC: 310 MG/DL (ref 188–341)
WBC # BLD AUTO: 4.2 THOUSAND/UL (ref 3.8–10.8)

## 2023-11-15 PROCEDURE — 99214 OFFICE O/P EST MOD 30 MIN: CPT | Performed by: INTERNAL MEDICINE

## 2023-11-16 ENCOUNTER — TELEPHONE (OUTPATIENT)
Dept: HEMATOLOGY ONCOLOGY | Facility: CLINIC | Age: 53
End: 2023-11-16

## 2023-11-16 NOTE — TELEPHONE ENCOUNTER
Spoke with pt to confirm f/u appt date and time with Dr. Collin Cohen and let her know MRI department will be reaching out to her to schedule whole body MRI. Pt inquired if MRI can be switched to 2 separate scans d/t length of time. I let pt know I will speak with Dr. Collin Cohen and get back to her.  Pt verbalized understanding

## 2023-11-17 ENCOUNTER — TELEPHONE (OUTPATIENT)
Dept: HEMATOLOGY ONCOLOGY | Facility: CLINIC | Age: 53
End: 2023-11-17

## 2023-11-17 NOTE — TELEPHONE ENCOUNTER
Patient Call    Who are you speaking with? Patient    If it is not the patient, are they listed on an active communication consent form? N/A   What is the reason for this call? Patient states she wants to confirm the MRI ordered is for next year, since she was told every other year and she already had one for this year   Does this require a call back? Yes   If a call back is required, please list best call back number [phone]   If a call back is required, advise that a message will be forwarded to their care team and someone will return their call as soon as possible. Did you relay this information to the patient?  Yes

## 2023-11-17 NOTE — TELEPHONE ENCOUNTER
Spoke with patient to let her know Dr. Praful Dickens does recommend the [facility] whole body MRI vs. 2 separate MRIs and reminded her MRI schedulers with reach out to schedule this specific MRI.  Pt verbalized understanding and knows to call back with any questions or concerns

## 2023-11-17 NOTE — TELEPHONE ENCOUNTER
Spoke with patient to clarify that Dr. Collin Cohen would like her to have her whole body MRI Novemeber 2024 and then likely every other year after that depending on results. Pt verbalized understanding.  Pt informed me MRI does not book out that far so we will try again to schedule in 2 months

## 2023-11-19 NOTE — PROGRESS NOTES
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 She had an MRI of the neck and abdomen 1 year ago along with a CT of the chest.  She also had an EGD and colonoscopy this year that was normal.  She is up-to-date on her mammogram.  Labs prior to this visit show a ferritin of 22 and hemoglobin of 13.5. She had urine metanephrines done in 2023 that were also normal.  Her blood pressure is normal in the office today. She denies any abdominal pain. No unexplained weight loss. She has not felt any new masses or concerning findings in the neck or axilla. She denies any headaches. No new pain. REVIEW OF SYSTEMS:  Please note that a 14-point review of systems was performed to include Constitutional, HEENT, Respiratory, CVS, GI, , Musculoskeletal, Integumentary, Neurologic, Rheumatologic, Endocrinologic, Psychiatric, Lymphatic, and Hematologic/Oncologic systems were reviewed and are negative unless otherwise stated in HPI. Positive and negative findings pertinent to this evaluation are incorporated into the history of present illness.       ECOG PS: 0    PROBLEM LIST:  Patient Active Problem List   Diagnosis    BMI 21.0-21.9, adult    Perimenopausal    Post-menopausal    Contraceptive education    Family history of breast cancer in mother    Encounter for screening mammogram for malignant neoplasm of breast    Encounter for gynecological examination without abnormal finding       Assessment / Plan: 58-year-old female with a personal history of resected paraganglioma in the neck and an SDHB pathogenic mutation for hereditary paraganglioma/pheochromocytoma syndrome. There is a risk of RCC and just with this malignancy. Imaging is recommended every other year and I will see her back in 1 year with a full body MRI protocol. This protocol has been implemented in our radiology department since her last imaging studies. She will be due her for her next urine metanephrines in February and I have given her prescription for that as well. We will continue to monitor for any evidence of iron deficiency or melena. Her iron levels are stable and on the lower side of normal.  She is not anemic. She is up-to-date on her mammograms as well. Knows to call me in the interim with any questions or concerns. I spent 30 minutes on chart review, face to face counseling time, coordination of care and documentation. Past Medical History:   has a past medical history of Breast cyst, left and Paraganglioma pheochromocytoma. PAST SURGICAL HISTORY:   has a past surgical history that includes Hysteroscopy w/ endometrial ablation; Hysteroscopy w/ polypectomy;  section; Tonsillectomy; Cystectomy; Thyroplasty; Bunionectomy; Colonoscopy; and EGD. CURRENT MEDICATIONS  Current Outpatient Medications   Medication Sig Dispense Refill    Cholecalciferol 10 MCG (400 UNIT) CHEW Chew      multivitamin (THERAGRAN) TABS Take 1 tablet by mouth daily       No current facility-administered medications for this visit. [unfilled]    SOCIAL HISTORY:   reports that she has never smoked. She has never been exposed to tobacco smoke. She has never used smokeless tobacco. She reports current alcohol use. She reports that she does not use drugs.      FAMILY HISTORY:  family history includes Breast cancer (age of onset: 78) in her mother; Diabetes in her maternal grandmother; Hypertension in her father, maternal grandfather, maternal grandmother, and mother; No Known Problems in her daughter, daughter, daughter, maternal aunt, maternal aunt, maternal aunt, paternal aunt, paternal grandfather, paternal grandmother, sister, and sister; Osteoporosis in her maternal grandmother and mother. ALLERGIES:  is allergic to cephalexin, gadobenate, nitrofurantoin, and sulfamethoxazole-trimethoprim. Physical Exam:  Vital Signs:   Visit Vitals  /79 (BP Location: Right arm, Patient Position: Sitting, Cuff Size: Standard)   Pulse 71   Temp 98 °F (36.7 °C) (Temporal)   Resp 18   Ht 5' 4" (1.626 m)   Wt 58.5 kg (129 lb)   LMP  (LMP Unknown) Comment: LMP Nov 2021, denies pregnancy   SpO2 98%   BMI 22.14 kg/m²   OB Status Postmenopausal   Smoking Status Never   BSA 1.62 m²     Body mass index is 22.14 kg/m². Body surface area is 1.62 meters squared. GEN: Alert, awake oriented x3, in no acute distress  HEENT- No pallor, icterus, cyanosis, no oral mucosal lesions,   LAD - no palpable cervical, clavicle, axillary, inguinal LAD  Heart- normal S1 S2, regular rate and rhythm, No murmur, rubs.    Lungs- clear breathing sound bilateral.   Abdomen- soft, Non tender, bowel sounds present  Extremities- No cyanosis, clubbing, edema  Neuro- No focal neurological deficit    Labs:  Lab Results   Component Value Date    WBC 4.2 11/14/2023    HGB 13.5 11/14/2023    HCT 41.4 11/14/2023    MCV 82.1 11/14/2023     11/14/2023     Lab Results   Component Value Date    SODIUM 142 11/14/2023    K 4.1 11/14/2023     11/14/2023    CO2 30 11/14/2023    BUN 18 11/14/2023    CREATININE 0.88 11/14/2023    GLUC 84 11/14/2023    CALCIUM 9.6 11/14/2023    AST 17 11/14/2023    ALT 13 11/14/2023    ALKPHOS 73 11/14/2023    TP 6.8 11/14/2023    TBILI 0.7 11/14/2023    EGFR 78 11/14/2023

## 2024-02-21 PROBLEM — Z01.419 ENCOUNTER FOR GYNECOLOGICAL EXAMINATION WITHOUT ABNORMAL FINDING: Status: RESOLVED | Noted: 2023-06-02 | Resolved: 2024-02-21

## 2024-05-28 ENCOUNTER — TELEPHONE (OUTPATIENT)
Dept: OBGYN CLINIC | Facility: MEDICAL CENTER | Age: 54
End: 2024-05-28

## 2024-05-28 ENCOUNTER — TELEPHONE (OUTPATIENT)
Age: 54
End: 2024-05-28

## 2024-05-28 DIAGNOSIS — Z78.0 ENCOUNTER FOR OSTEOPOROSIS SCREENING IN ASYMPTOMATIC POSTMENOPAUSAL PATIENT: Primary | ICD-10-CM

## 2024-05-28 DIAGNOSIS — Z82.62 FAMILY HX OSTEOPOROSIS: ICD-10-CM

## 2024-05-28 DIAGNOSIS — Z13.820 ENCOUNTER FOR OSTEOPOROSIS SCREENING IN ASYMPTOMATIC POSTMENOPAUSAL PATIENT: Primary | ICD-10-CM

## 2024-05-28 NOTE — TELEPHONE ENCOUNTER
LM to patient, stating DXA script is in chart.    Detail Level: Simple Sunscreen Recommendations: 50+ sunscreen with Zinc/Titanium. Detail Level: Zone Sunscreen Recommendations: 50+ sunscreen with zinc and titanium dioxide.

## 2024-05-28 NOTE — TELEPHONE ENCOUNTER
Patient would like script generated for a DEXA scan, please advise and call patient to notify when she can call central scheduling to schedule.

## 2024-06-06 NOTE — PROGRESS NOTES
[facility] - [address], [address], [place] [address]    ASSESSMENT/PLAN: Radha Lua is a 53 y.o.  who presents for annual gynecologic exam.    Encounter for routine gynecologic examination  - Routine well woman exam completed today.  - Cervical Cancer Screening: Current ASCCP Guidelines reviewed. Last Pap: 2023 . Next Pap Due:   - Contraceptive counseling discussed.  Current contraception: none or condoms:   - Breast Cancer Screening: Last Mammogram 2023,   - Colorectal cancer screening was not ordered. Done    - The following were reviewed in today's visit: breast self exam, mammography screening ordered, menopause, osteoporosis, adequate intake of calcium and vitamin D, exercise, healthy diet, and colonoscopy discussed and ordered    Additional problems addressed during this visit:  1. Encounter for gynecological examination without abnormal finding  2. Encounter for screening mammogram for malignant neoplasm of breast  -     Mammo screening bilateral w 3d & cad; Future  3. Family history of breast cancer in mother  4. Post-menopausal  Comments:  No bleeding bloaitng or satiety      CC:  Annual Gynecologic Examination    HPI: Radha Lua is a 53 y.o.  who presents for annual gynecologic examination.  52 yo  here for wellness exam.  No bleeding, bloating or satiety.   + wt loss  due to running .  Pending  Dexa.       The following portions of the patient's history were reviewed and updated as appropriate: She  has a past medical history of Breast cyst, left and Paraganglioma pheochromocytoma.  She  has a past surgical history that includes Hysteroscopy w/ endometrial ablation; Hysteroscopy w/ polypectomy;  section; Tonsillectomy; Cystectomy; Thyroplasty; Bunionectomy; Colonoscopy; and EGD.  Her family history includes Breast cancer (age of onset: 79) in her mother; Diabetes in her maternal grandmother; Hypertension in her father, maternal  "grandfather, maternal grandmother, and mother; No Known Problems in her daughter, daughter, daughter, maternal aunt, maternal aunt, maternal aunt, paternal aunt, paternal grandfather, paternal grandmother, sister, and sister; Osteoporosis in her maternal grandmother and mother.  She  reports that she has never smoked. She has never been exposed to tobacco smoke. She has never used smokeless tobacco. She reports current alcohol use. She reports that she does not use drugs.  Current Outpatient Medications   Medication Sig Dispense Refill   • Cholecalciferol 10 MCG (400 UNIT) CHEW Chew     • multivitamin (THERAGRAN) TABS Take 1 tablet by mouth daily       No current facility-administered medications for this visit.     She is allergic to cephalexin, gadobenate, nitrofurantoin, and sulfamethoxazole-trimethoprim..    Review of Systems:  All systems normal except as noted in HPI          Objective:  /72 (BP Location: Left arm, Patient Position: Sitting, Cuff Size: Standard)   Ht 5' 3.5\" (1.613 m)   Wt 54.2 kg (119 lb 6.4 oz)   LMP  (LMP Unknown) Comment: LMP Nov 2021, denies pregnancy  Breastfeeding No   BMI 20.82 kg/m²    Physical Exam  Vitals and nursing note reviewed.   Constitutional:       Appearance: Normal appearance.   HENT:      Head: Normocephalic.   Cardiovascular:      Rate and Rhythm: Normal rate and regular rhythm.      Pulses: Normal pulses.      Heart sounds: Normal heart sounds.   Pulmonary:      Effort: Pulmonary effort is normal.      Breath sounds: Normal breath sounds.   Chest:      Chest wall: No mass, lacerations, swelling, tenderness or edema.   Breasts:     Jameson Score is 4.      Breasts are symmetrical.      Right: Normal. No swelling, bleeding, inverted nipple, mass, nipple discharge, skin change or tenderness.      Left: No swelling, bleeding, inverted nipple, mass, nipple discharge, skin change or tenderness.   Abdominal:      General: Abdomen is flat. Bowel sounds are normal.      " Palpations: Abdomen is soft.   Genitourinary:     General: Normal vulva.      Exam position: Lithotomy position.      Pubic Area: No rash.       Jameson stage (genital): 4.      Labia:         Right: No rash, tenderness or lesion.         Left: No rash, tenderness or lesion.       Urethra: No urethral pain, urethral swelling or urethral lesion.      Vagina: Normal.      Cervix: No cervical motion tenderness or discharge.      Uterus: Normal.       Adnexa: Right adnexa normal and left adnexa normal.      Rectum: Normal.   Musculoskeletal:         General: Normal range of motion.      Cervical back: Normal range of motion and neck supple.   Lymphadenopathy:      Upper Body:      Right upper body: No supraclavicular, axillary or pectoral adenopathy.      Left upper body: No supraclavicular, axillary or pectoral adenopathy.      Lower Body: No right inguinal adenopathy. No left inguinal adenopathy.   Skin:     General: Skin is warm and dry.   Neurological:      General: No focal deficit present.      Mental Status: She is alert and oriented to person, place, and time.   Psychiatric:         Mood and Affect: Mood normal.         Behavior: Behavior normal.         Thought Content: Thought content normal.         Judgment: Judgment normal.

## 2024-06-07 ENCOUNTER — ANNUAL EXAM (OUTPATIENT)
Dept: OBGYN CLINIC | Facility: CLINIC | Age: 54
End: 2024-06-07
Payer: COMMERCIAL

## 2024-06-07 VITALS
DIASTOLIC BLOOD PRESSURE: 72 MMHG | WEIGHT: 119.4 LBS | SYSTOLIC BLOOD PRESSURE: 108 MMHG | HEIGHT: 64 IN | BODY MASS INDEX: 20.38 KG/M2

## 2024-06-07 DIAGNOSIS — Z80.3 FAMILY HISTORY OF BREAST CANCER IN MOTHER: ICD-10-CM

## 2024-06-07 DIAGNOSIS — Z01.419 ENCOUNTER FOR GYNECOLOGICAL EXAMINATION WITHOUT ABNORMAL FINDING: Primary | ICD-10-CM

## 2024-06-07 DIAGNOSIS — Z12.31 ENCOUNTER FOR SCREENING MAMMOGRAM FOR MALIGNANT NEOPLASM OF BREAST: ICD-10-CM

## 2024-06-07 DIAGNOSIS — Z78.0 POST-MENOPAUSAL: ICD-10-CM

## 2024-06-07 PROCEDURE — 99396 PREV VISIT EST AGE 40-64: CPT | Performed by: OBSTETRICS & GYNECOLOGY

## 2024-07-03 ENCOUNTER — HOSPITAL ENCOUNTER (OUTPATIENT)
Dept: BONE DENSITY | Facility: IMAGING CENTER | Age: 54
Discharge: HOME | End: 2024-07-03
Payer: COMMERCIAL

## 2024-07-03 VITALS — HEIGHT: 63 IN | WEIGHT: 115.6 LBS | BODY MASS INDEX: 20.48 KG/M2

## 2024-07-03 DIAGNOSIS — Z78.0 ENCOUNTER FOR OSTEOPOROSIS SCREENING IN ASYMPTOMATIC POSTMENOPAUSAL PATIENT: ICD-10-CM

## 2024-07-03 DIAGNOSIS — Z13.820 ENCOUNTER FOR OSTEOPOROSIS SCREENING IN ASYMPTOMATIC POSTMENOPAUSAL PATIENT: ICD-10-CM

## 2024-07-03 DIAGNOSIS — Z82.62 FAMILY HX OSTEOPOROSIS: ICD-10-CM

## 2024-07-03 PROCEDURE — 77080 DXA BONE DENSITY AXIAL: CPT

## 2024-07-07 PROBLEM — Z01.419 ENCOUNTER FOR GYNECOLOGICAL EXAMINATION WITHOUT ABNORMAL FINDING: Status: RESOLVED | Noted: 2024-06-07 | Resolved: 2024-07-07

## 2024-09-04 ENCOUNTER — HOSPITAL ENCOUNTER (OUTPATIENT)
Dept: MAMMOGRAPHY | Facility: IMAGING CENTER | Age: 54
Discharge: HOME | End: 2024-09-04
Payer: COMMERCIAL

## 2024-09-04 VITALS — BODY MASS INDEX: 20.38 KG/M2 | WEIGHT: 115 LBS | HEIGHT: 63 IN

## 2024-09-04 DIAGNOSIS — Z12.31 ENCOUNTER FOR SCREENING MAMMOGRAM FOR MALIGNANT NEOPLASM OF BREAST: ICD-10-CM

## 2024-09-04 PROCEDURE — 77067 SCR MAMMO BI INCL CAD: CPT

## 2024-09-04 PROCEDURE — 77063 BREAST TOMOSYNTHESIS BI: CPT

## 2024-09-18 ENCOUNTER — TELEPHONE (OUTPATIENT)
Age: 54
End: 2024-09-18

## 2024-09-18 NOTE — TELEPHONE ENCOUNTER
Radha called to confirm where to  the urine collection container for her 24 hour urine test. She will be going to [facility] for the test, so I provided her with the number for [facility] in [place] for her to call to check with them. Radha will call to clarify and will call back with any questions.

## 2024-11-01 ENCOUNTER — HOSPITAL ENCOUNTER (OUTPATIENT)
Dept: MRI IMAGING | Facility: HOSPITAL | Age: 54
Discharge: HOME | End: 2024-11-01
Payer: COMMERCIAL

## 2024-11-01 DIAGNOSIS — Z15.89 MONOALLELIC MUTATION OF SDHB GENE: ICD-10-CM

## 2024-11-01 PROCEDURE — 76498 UNLISTED MR PROCEDURE: CPT

## 2024-11-04 ENCOUNTER — TELEPHONE (OUTPATIENT)
Age: 54
End: 2024-11-04

## 2024-11-04 NOTE — TELEPHONE ENCOUNTER
Spoke with patient to let her know that her MRI was negative for any malignancies. Patient verbalized understanding and thanked me for the call.

## 2024-12-11 ENCOUNTER — TELEPHONE (OUTPATIENT)
Age: 54
End: 2024-12-11

## 2024-12-11 DIAGNOSIS — D50.0 IRON DEFICIENCY ANEMIA DUE TO CHRONIC BLOOD LOSS: Primary | ICD-10-CM

## 2024-12-13 ENCOUNTER — TELEPHONE (OUTPATIENT)
Age: 54
End: 2024-12-13

## 2024-12-13 NOTE — TELEPHONE ENCOUNTER
Spoke with patient about completing lab work prior to appointment with Dr. Gan on 12/18. Stated order is in her chart and has also been sent to [facility] as that is she lab she uses. Patient verbalized understanding. Advised to call us if she has any questions.

## 2024-12-14 ENCOUNTER — NURSE TRIAGE (OUTPATIENT)
Dept: OTHER | Facility: OTHER | Age: 54
End: 2024-12-14

## 2024-12-14 ENCOUNTER — TELEPHONE (OUTPATIENT)
Dept: OTHER | Facility: OTHER | Age: 54
End: 2024-12-14

## 2024-12-14 NOTE — TELEPHONE ENCOUNTER
"Regarding: Lab request  ----- Message from Pushpa RODRIGUEZ sent at 12/14/2024 10:47 AM EST -----  \" I am at the [facility] and they cannot find my labs. Can one be put in for me?\"    "

## 2024-12-14 NOTE — TELEPHONE ENCOUNTER
"Patient was able to present lab order to [facility] from her [employer] account and labs were drawn. No further questions or concerns.   Reason for Disposition   General information question, no triage required and triager able to answer question    Answer Assessment - Initial Assessment Questions  1. REASON FOR CALL: \"What is the main reason for your call?\" or \"How can I best help you?\"      Needed lab order sent to [facility]  2. SYMPTOMS : \"Do you have any symptoms?\"       N/A  3. OTHER QUESTIONS: \"Do you have any other questions?\"      No    Protocols used: Information Only Call - No Triage-Adult-    "

## 2024-12-18 ENCOUNTER — OFFICE VISIT (OUTPATIENT)
Age: 54
End: 2024-12-18
Payer: COMMERCIAL

## 2024-12-18 VITALS
TEMPERATURE: 97.7 F | BODY MASS INDEX: 20.55 KG/M2 | WEIGHT: 116 LBS | OXYGEN SATURATION: 98 % | HEART RATE: 68 BPM | DIASTOLIC BLOOD PRESSURE: 72 MMHG | SYSTOLIC BLOOD PRESSURE: 118 MMHG | HEIGHT: 63 IN | RESPIRATION RATE: 16 BRPM

## 2024-12-18 DIAGNOSIS — Z15.89 MONOALLELIC MUTATION OF SDHB GENE: Primary | ICD-10-CM

## 2024-12-18 DIAGNOSIS — D44.7 PARAGANGLIOMA (HCC): ICD-10-CM

## 2024-12-18 PROCEDURE — 99213 OFFICE O/P EST LOW 20 MIN: CPT | Performed by: INTERNAL MEDICINE

## 2024-12-21 NOTE — PROGRESS NOTES
"Name: Radha Lua      : 1970      MRN: [id number]  Encounter Provider: Monique Gan DO  Encounter Date: 2024   Encounter department: [facility]  :  Assessment & Plan  Monoallelic mutation of SDHB gene    Orders:    CBC and differential; Future    Comprehensive metabolic panel; Future    Iron Panel (Includes Ferritin, Iron Sat%, Iron, and TIBC); Future    Metanephrines Fractionated, urine, 24 hour; Future    Paraganglioma (HCC)         54-year-old female with her entire paraganglioma pheochromocytoma syndrome with SDHB pathogenic mutation.  Her full body MRI was negative and I will follow-up on her urine metanephrines.  Overall she is doing well and is up-to-date on all of her screening.  Her blood pressure is normal.  I will see her back in 1 year with a CBC, CMP, iron studies, urine metanephrines.  She knows to call in the interim with any questions or concerns.    History of Present Illness   Chief Complaint   Patient presents with    Follow-up   Radha Lua is a 54 y.o. female her SDHB mutation and hereditary paraganglioma pheochromocytoma syndrome.  She had a full body MRI prior to this visit that was normal.  Her urine metanephrines are pending.  Ferritin is 22 and hemoglobin is 13.5.  She had very heavy periods in the past.  She tries to focus on eating iron rich foods.  She had an EGD and colonoscopy in 2023 and was recommended to have another in 3 years.  She has lost 13 pounds since our last visit but has started running marathons.  She denies any headaches or new pain.  She has not palpated any new masses.          Review of Systems otherwise neg        Objective   /72 (BP Location: Left arm, Patient Position: Sitting, Cuff Size: Adult)   Pulse 68   Temp 97.7 °F (36.5 °C) (Temporal)   Resp 16   Ht 5' 3\" (1.6 m)   Wt 52.6 kg (116 lb)   LMP  (LMP Unknown) Comment: LMP 2021, denies pregnancy  SpO2 98%   BMI 20.55 " "kg/m²     ECOG   0  Physical Exam    GEN: Alert, awake oriented x3, in no acute distress  HEENT- No pallor, icterus, cyanosis, no oral mucosal lesions,   LAD - no palpable cervical, clavicle, axillary, inguinal LAD  Heart- normal S1 S2, regular rate and rhythm, No murmur, rubs.   Lungs- clear breathing sound bilateral.   Abdomen- soft, Non tender, bowel sounds present  Extremities- No cyanosis, clubbing, edema  Neuro- No focal neurological deficit      Labs: I have reviewed the following labs:  No results found for: \"WBC\", \"RBC\", \"HGB\", \"HCT\", \"MCV\", \"MCH\", \"RDW\", \"PLT\", \"NEUTOPHILPCT\", \"LYMPHOPCT\", \"MONOPCT\", \"EOSPCT\", \"BASOPCT\", \"IMMGRANS\", \"NEUTROABS\"  No results found for: \"NA\", \"K\", \"CL\", \"CO2\", \"ANIONGAP\", \"BUN\", \"CREATININE\", \"GLUCOSE\", \"GLUF\", \"CALCIUM\", \"CORRECTEDCA\", \"AST\", \"ALT\", \"ALKPHOS\", \"TP\", \"ALB\", \"TBILI\", \"EGFR\"      Radiology Results Review: I have reviewed radiology reports from prior to this visit including: full body MRI.      "

## 2025-06-10 ENCOUNTER — ANNUAL EXAM (OUTPATIENT)
Dept: OBGYN CLINIC | Facility: CLINIC | Age: 55
End: 2025-06-10
Payer: COMMERCIAL

## 2025-06-10 VITALS
BODY MASS INDEX: 20.2 KG/M2 | SYSTOLIC BLOOD PRESSURE: 108 MMHG | DIASTOLIC BLOOD PRESSURE: 70 MMHG | WEIGHT: 114 LBS | HEIGHT: 63 IN

## 2025-06-10 DIAGNOSIS — Z82.62 FAMILY HX OSTEOPOROSIS: ICD-10-CM

## 2025-06-10 DIAGNOSIS — Z78.0 POST-MENOPAUSAL: ICD-10-CM

## 2025-06-10 DIAGNOSIS — Z01.419 ENCOUNTER FOR GYNECOLOGICAL EXAMINATION WITHOUT ABNORMAL FINDING: Primary | ICD-10-CM

## 2025-06-10 DIAGNOSIS — Z80.3 FAMILY HISTORY OF BREAST CANCER IN MOTHER: ICD-10-CM

## 2025-06-10 DIAGNOSIS — Z12.31 ENCOUNTER FOR SCREENING MAMMOGRAM FOR MALIGNANT NEOPLASM OF BREAST: ICD-10-CM

## 2025-06-10 PROCEDURE — S0612 ANNUAL GYNECOLOGICAL EXAMINA: HCPCS | Performed by: OBSTETRICS & GYNECOLOGY

## 2025-06-10 NOTE — PROGRESS NOTES
[facility] - [address], [address], [place] [address]    ASSESSMENT/PLAN: Radha Lua is a 54 y.o.  who presents for annual gynecologic exam.    Encounter for routine gynecologic examination  - Routine well woman exam completed today.  - Cervical Cancer Screening: Current ASCCP Guidelines reviewed. Last Pap: 2023  Next Pap Due:   - Breast Cancer Screening: Last Mammogram 2024,   - Colorectal cancer screening was not ordered.  - The following were reviewed in today's visit: breast self exam, mammogram,  menopause    Additional problems addressed during this visit:  1. Encounter for gynecological examination without abnormal finding  2. Encounter for screening mammogram for malignant neoplasm of breast  -     Mammo screening bilateral w 3d and cad; Future  3. Family history of breast cancer in mother  4. Post-menopausal  5. Family hx osteoporosis      CC:  Annual Gynecologic Examination    HPI: Radha Lua is a 54 y.o.  who presents for annual gynecologic examination.  55 yo    here for wellness   due in .  No bleeding, bloating or satiety.  + runner     Some hot flashes and night sweats.    + calcium in diet     The following portions of the patient's history were reviewed and updated as appropriate: She  has a past medical history of Breast cyst, left, Colon polyp, GERD (gastroesophageal reflux disease) (as a child), and Paraganglioma pheochromocytoma.  She  has a past surgical history that includes Hysteroscopy w/ endometrial ablation; Hysteroscopy w/ polypectomy;  section; Tonsillectomy; Cystectomy; Thyroplasty; Bunionectomy; Colonoscopy; and EGD.  Her family history includes Asthma in her sister; Breast cancer (age of onset: 79) in her mother; Diabetes in her maternal grandmother, mother, and sister; Hypertension in her father, maternal grandfather, maternal grandmother, mother, and sister; Kidney disease in her mother; No Known  "Problems in her daughter, daughter, daughter, maternal aunt, maternal aunt, maternal aunt, paternal aunt, paternal grandfather, paternal grandmother, and sister; Osteoporosis in her maternal grandmother and mother.  She  reports that she has never smoked. She has never been exposed to tobacco smoke. She has never used smokeless tobacco. She reports current alcohol use. She reports that she does not use drugs.  Current Outpatient Medications   Medication Sig Dispense Refill   • Cholecalciferol 10 MCG (400 UNIT) CHEW Chew     • Misc Natural Products (BEET ROOT PO) Take by mouth BEET CHEW     • multivitamin (THERAGRAN) TABS Take 1 tablet by mouth in the morning.       No current facility-administered medications for this visit.     She is allergic to cephalexin, gadobenate, nitrofurantoin, and sulfamethoxazole-trimethoprim..    Review of Systems:  All systems normal except as noted in HPI          Objective:  /70 (BP Location: Left arm, Patient Position: Sitting, Cuff Size: Standard)   Ht 5' 3\" (1.6 m)   Wt 51.7 kg (114 lb)   LMP  (LMP Unknown) Comment: LMP Nov 2021, denies pregnancy  BMI 20.19 kg/m²    Physical Exam  Vitals and nursing note reviewed.   Constitutional:       Appearance: Normal appearance.   HENT:      Head: Normocephalic.     Cardiovascular:      Rate and Rhythm: Normal rate and regular rhythm.      Pulses: Normal pulses.      Heart sounds: Normal heart sounds.   Pulmonary:      Effort: Pulmonary effort is normal.      Breath sounds: Normal breath sounds.   Chest:      Chest wall: No mass, lacerations, swelling, tenderness or edema.   Breasts:     Jameson Score is 4.      Breasts are symmetrical.      Right: Normal. No swelling, bleeding, inverted nipple, mass, nipple discharge, skin change or tenderness.      Left: No swelling, bleeding, inverted nipple, mass, nipple discharge, skin change or tenderness.   Abdominal:      General: Abdomen is flat. Bowel sounds are normal.      Palpations: " Abdomen is soft.   Genitourinary:     General: Normal vulva.      Exam position: Lithotomy position.      Pubic Area: No rash.       Jameson stage (genital): 4.      Labia:         Right: No rash, tenderness or lesion.         Left: No rash, tenderness or lesion.       Urethra: No urethral pain, urethral swelling or urethral lesion.      Vagina: Normal.      Cervix: No cervical motion tenderness or discharge.      Uterus: Normal.       Adnexa: Right adnexa normal and left adnexa normal.      Rectum: Normal.     Musculoskeletal:         General: Normal range of motion.      Cervical back: Normal range of motion and neck supple.   Lymphadenopathy:      Upper Body:      Right upper body: No supraclavicular, axillary or pectoral adenopathy.      Left upper body: No supraclavicular, axillary or pectoral adenopathy.      Lower Body: No right inguinal adenopathy. No left inguinal adenopathy.     Skin:     General: Skin is warm and dry.     Neurological:      General: No focal deficit present.      Mental Status: She is alert and oriented to person, place, and time.     Psychiatric:         Mood and Affect: Mood normal.         Behavior: Behavior normal.         Thought Content: Thought content normal.         Judgment: Judgment normal.